# Patient Record
Sex: FEMALE | Employment: FULL TIME | URBAN - METROPOLITAN AREA
[De-identification: names, ages, dates, MRNs, and addresses within clinical notes are randomized per-mention and may not be internally consistent; named-entity substitution may affect disease eponyms.]

---

## 2019-05-27 ENCOUNTER — HOSPITAL ENCOUNTER (EMERGENCY)
Facility: CLINIC | Age: 44
Discharge: HOME | End: 2019-05-27
Attending: NURSE PRACTITIONER | Admitting: NURSE PRACTITIONER
Payer: COMMERCIAL

## 2019-05-27 VITALS
RESPIRATION RATE: 16 BRPM | TEMPERATURE: 98.4 F | OXYGEN SATURATION: 96 % | WEIGHT: 160 LBS | HEART RATE: 90 BPM | DIASTOLIC BLOOD PRESSURE: 74 MMHG | SYSTOLIC BLOOD PRESSURE: 138 MMHG

## 2019-05-27 DIAGNOSIS — M25.50 MULTIPLE JOINT PAIN: ICD-10-CM

## 2019-05-27 LAB
ALBUMIN SERPL-MCNC: 3.7 G/DL (ref 3.4–5)
ALP SERPL-CCNC: 104 U/L (ref 40–150)
ALT SERPL W P-5'-P-CCNC: 21 U/L (ref 0–50)
ANION GAP SERPL CALCULATED.3IONS-SCNC: 5 MMOL/L (ref 3–14)
AST SERPL W P-5'-P-CCNC: 12 U/L (ref 0–45)
BASOPHILS # BLD AUTO: 0 10E9/L (ref 0–0.2)
BASOPHILS NFR BLD AUTO: 0.3 %
BILIRUB SERPL-MCNC: 0.4 MG/DL (ref 0.2–1.3)
BUN SERPL-MCNC: 11 MG/DL (ref 7–30)
CALCIUM SERPL-MCNC: 9.4 MG/DL (ref 8.5–10.1)
CHLORIDE SERPL-SCNC: 104 MMOL/L (ref 94–109)
CO2 SERPL-SCNC: 29 MMOL/L (ref 20–32)
CREAT SERPL-MCNC: 0.67 MG/DL (ref 0.52–1.04)
CRP SERPL-MCNC: 52.7 MG/L (ref 0–8)
DIFFERENTIAL METHOD BLD: ABNORMAL
EOSINOPHIL # BLD AUTO: 0.3 10E9/L (ref 0–0.7)
EOSINOPHIL NFR BLD AUTO: 3.8 %
ERYTHROCYTE [DISTWIDTH] IN BLOOD BY AUTOMATED COUNT: 12.9 % (ref 10–15)
ERYTHROCYTE [SEDIMENTATION RATE] IN BLOOD BY WESTERGREN METHOD: 18 MM/H (ref 0–20)
GFR SERPL CREATININE-BSD FRML MDRD: >90 ML/MIN/{1.73_M2}
GLUCOSE SERPL-MCNC: 103 MG/DL (ref 70–99)
HCT VFR BLD AUTO: 45.9 % (ref 35–47)
HGB BLD-MCNC: 14.8 G/DL (ref 11.7–15.7)
IMM GRANULOCYTES # BLD: 0.1 10E9/L (ref 0–0.4)
IMM GRANULOCYTES NFR BLD: 0.6 %
LYMPHOCYTES # BLD AUTO: 2 10E9/L (ref 0.8–5.3)
LYMPHOCYTES NFR BLD AUTO: 21.5 %
MCH RBC QN AUTO: 27.8 PG (ref 26.5–33)
MCHC RBC AUTO-ENTMCNC: 32.2 G/DL (ref 31.5–36.5)
MCV RBC AUTO: 86 FL (ref 78–100)
MONOCYTES # BLD AUTO: 0.7 10E9/L (ref 0–1.3)
MONOCYTES NFR BLD AUTO: 7.8 %
NEUTROPHILS # BLD AUTO: 6 10E9/L (ref 1.6–8.3)
NEUTROPHILS NFR BLD AUTO: 66 %
NRBC # BLD AUTO: 0 10*3/UL
NRBC BLD AUTO-RTO: 0 /100
PLATELET # BLD AUTO: 417 10E9/L (ref 150–450)
POTASSIUM SERPL-SCNC: 3.6 MMOL/L (ref 3.4–5.3)
PROT SERPL-MCNC: 7.9 G/DL (ref 6.8–8.8)
RBC # BLD AUTO: 5.32 10E12/L (ref 3.8–5.2)
SODIUM SERPL-SCNC: 138 MMOL/L (ref 133–144)
TSH SERPL DL<=0.005 MIU/L-ACNC: 1.76 MU/L (ref 0.4–4)
WBC # BLD AUTO: 9.1 10E9/L (ref 4–11)

## 2019-05-27 PROCEDURE — 86618 LYME DISEASE ANTIBODY: CPT | Performed by: NURSE PRACTITIONER

## 2019-05-27 PROCEDURE — 85652 RBC SED RATE AUTOMATED: CPT | Performed by: NURSE PRACTITIONER

## 2019-05-27 PROCEDURE — 84443 ASSAY THYROID STIM HORMONE: CPT | Performed by: NURSE PRACTITIONER

## 2019-05-27 PROCEDURE — 86140 C-REACTIVE PROTEIN: CPT | Performed by: NURSE PRACTITIONER

## 2019-05-27 PROCEDURE — 99284 EMERGENCY DEPT VISIT MOD MDM: CPT | Mod: Z6 | Performed by: NURSE PRACTITIONER

## 2019-05-27 PROCEDURE — 99283 EMERGENCY DEPT VISIT LOW MDM: CPT | Performed by: NURSE PRACTITIONER

## 2019-05-27 PROCEDURE — 80053 COMPREHEN METABOLIC PANEL: CPT | Performed by: NURSE PRACTITIONER

## 2019-05-27 PROCEDURE — 85025 COMPLETE CBC W/AUTO DIFF WBC: CPT | Performed by: NURSE PRACTITIONER

## 2019-05-27 RX ORDER — IBUPROFEN 200 MG
200 TABLET ORAL EVERY 4 HOURS PRN
COMMUNITY

## 2019-05-27 RX ORDER — PREDNISONE 20 MG/1
TABLET ORAL
Qty: 10 TABLET | Refills: 0 | Status: SHIPPED | OUTPATIENT
Start: 2019-05-27 | End: 2019-05-31

## 2019-05-27 ASSESSMENT — ENCOUNTER SYMPTOMS
WHEEZING: 0
COUGH: 0
CONFUSION: 0
EYE REDNESS: 0
JOINT SWELLING: 1
SHORTNESS OF BREATH: 0
DYSURIA: 0
CONSTIPATION: 0
VOMITING: 0
NAUSEA: 0
HEADACHES: 0
FEVER: 0
CHILLS: 0
MYALGIAS: 1
ARTHRALGIAS: 1
ABDOMINAL PAIN: 0
DIAPHORESIS: 0
DIFFICULTY URINATING: 0
DIARRHEA: 0

## 2019-05-27 NOTE — DISCHARGE INSTRUCTIONS
Start taking the prednisone and take 2 tablets this afternoon and then continue taking 2 tablets once daily every morning for the remaining 4 days.  For pain management stop taking the ibuprofen until you have finished up with the prednisone.  Start taking 2 extra strength Tylenol 4 times daily for pain management.  With the prednisone or any ibuprofen this may increase your risk of gastrointestinal bleeding and stop if you should start to have any blood in the stool.  Follow-up with the rheumatologist of your choice.  I have sent a referral in for rheumatology at [facility] due to lack of availability sooner here.

## 2019-05-27 NOTE — ED NOTES
Pt here with joint pain, started to notice joint pain in her hands about 1 year ago, pain was worse over the winter. Over the past week the pain has gotten much worse, she has pain in every joint and swelling in her hands and feel. Pt was told to see a rheumatologist about 1 week ago but can't get in until the end of June. Has psoriasis on her neck- well controlled.

## 2019-05-27 NOTE — ED PROVIDER NOTES
"  History     Chief Complaint   Patient presents with     Joint Pain     all over body joint pain. Shooting pain with any mvmt or lack of mvmt. Started worse the pst week.      Swelling     all over body swelling, arms and legs.      HPI     Norma Freire is a 44 year old female who who admits to past medical history of scalp psoriasis presenting to the emergency department with generalized joint pains.  Patient states she has had some intermittent pain over the past year and and reports that over the past month it seems to be getting slowly progressively worse.  Patient states over the past week she has been having difficulty with getting out of bed in the morning due to the severe pain and difficulty with her job of using her hands.  Patient describes that the majority of pain is in her left hand and finger joint and in her right wrist area and in the metatarsal phalangeal joints of bilateral feet.  Patient states that the feet feel \"puffy.\"  Patient denies fever, aches, chills, sweats.  Patient denies previous work-up with rheumatology but does admit that when she saw her dermatologist approximately 1 month ago that she consider seeing a rheumatologist.  Patient states that she called 1 rheumatologist and was not able to get in until the end of June and it was only if she had a referral.  Patient states she tried calling the [employer]  [place] or [employer] [facility] and was advised that first available opening was in September.  Patient reports that she has been taking 600 mg of ibuprofen every 4 hours for pain management and reports that this makes the pain slightly bearable but reports still severe aching pain.    Allergies:  No Known Allergies    Problem List:    There are no active problems to display for this patient.       Past Medical History:    No past medical history on file.    Past Surgical History:    No past surgical history on file.    Family History:    No family history on file.    Social " History:  Marital Status:   [2]  Social History     Tobacco Use     Smoking status: Not on file   Substance Use Topics     Alcohol use: Not on file     Drug use: Not on file        Medications:      ibuprofen (ADVIL/MOTRIN) 200 MG tablet   predniSONE (DELTASONE) 20 MG tablet     Review of Systems   Constitutional: Negative for chills, diaphoresis and fever.   HENT: Negative for congestion and ear pain.    Eyes: Negative for redness and visual disturbance.   Respiratory: Negative for cough, shortness of breath and wheezing.    Cardiovascular: Negative for chest pain.   Gastrointestinal: Negative for abdominal pain, constipation, diarrhea, nausea and vomiting.   Genitourinary: Negative for difficulty urinating and dysuria.   Musculoskeletal: Positive for arthralgias (wrists, hands, knees, feet bilaterally), joint swelling (metatarsal phalangeal, left hand, right wrist) and myalgias.   Neurological: Negative for headaches.   Psychiatric/Behavioral: Negative for confusion.   All other systems reviewed and are negative.      Physical Exam   BP: 138/74  Pulse: 90  Temp: 98.4  F (36.9  C)  Resp: 16  Weight: 72.6 kg (160 lb)  SpO2: 96 %      Physical Exam   Constitutional: She is oriented to person, place, and time. She appears well-developed and well-nourished. No distress.   HENT:   Head: Normocephalic and atraumatic.   Right Ear: External ear normal.   Left Ear: External ear normal.   Eyes: Conjunctivae are normal. Right eye exhibits no discharge. Left eye exhibits no discharge. No scleral icterus.   Neck: Normal range of motion. Neck supple.   Cardiovascular: Normal rate, regular rhythm and normal heart sounds. Exam reveals no friction rub.   No murmur heard.  Pulmonary/Chest: Effort normal and breath sounds normal. No stridor. No respiratory distress. She has no wheezes. She has no rales. She exhibits no tenderness.   Musculoskeletal:        Right wrist: She exhibits bony tenderness (at the joint wtihout  erythema). She exhibits no swelling and no effusion.        Left wrist: She exhibits bony tenderness (without swelling, erythema, effusion). She exhibits normal range of motion, no swelling, no effusion, no crepitus and no deformity.        Right knee: Normal. She exhibits normal range of motion, no swelling, no effusion, no ecchymosis, no deformity, no laceration and no erythema. No tenderness (no palpable tenderness today) found.        Left knee: She exhibits normal range of motion, no swelling, no effusion, no ecchymosis, no deformity, no laceration and no erythema. No tenderness (no palpable tenderness today) found.        Right hand: She exhibits bony tenderness (metacarpalphalangeal tenderness without erythema, warmth) and swelling (trace). She exhibits normal capillary refill, no deformity and no laceration. Normal sensation noted. Normal strength noted.        Left hand: She exhibits bony tenderness (metacarpalphalangel joints) and swelling (trace hand and finger swelling without erythema, warmth). She exhibits normal range of motion, normal two-point discrimination, normal capillary refill, no deformity and no laceration.        Right foot: There is tenderness, bony tenderness (along metatarsalphalangeal joints with trace swelling without erythema, warmth, deformity.) and swelling. There is normal capillary refill, no crepitus, no deformity and no laceration.        Left foot: There is bony tenderness (along metatarsalphalangeal joints with trace swelling without erythema, warmth, deformity) and swelling. There is normal range of motion, normal capillary refill, no crepitus, no deformity and no laceration.   Neurological: She is alert and oriented to person, place, and time. Coordination normal.   Skin: Skin is warm and dry. No rash noted. She is not diaphoretic. No erythema. No pallor.   Psychiatric: She has a normal mood and affect.   Nursing note and vitals reviewed.      ED Course         Procedures    Results for orders placed or performed during the hospital encounter of 05/27/19 (from the past 24 hour(s))   TSH with free T4 reflex   Result Value Ref Range    TSH 1.76 0.40 - 4.00 mU/L   Comprehensive metabolic panel   Result Value Ref Range    Sodium 138 133 - 144 mmol/L    Potassium 3.6 3.4 - 5.3 mmol/L    Chloride 104 94 - 109 mmol/L    Carbon Dioxide 29 20 - 32 mmol/L    Anion Gap 5 3 - 14 mmol/L    Glucose 103 (H) 70 - 99 mg/dL    Urea Nitrogen 11 7 - 30 mg/dL    Creatinine 0.67 0.52 - 1.04 mg/dL    GFR Estimate >90 >60 mL/min/[1.73_m2]    GFR Estimate If Black >90 >60 mL/min/[1.73_m2]    Calcium 9.4 8.5 - 10.1 mg/dL    Bilirubin Total 0.4 0.2 - 1.3 mg/dL    Albumin 3.7 3.4 - 5.0 g/dL    Protein Total 7.9 6.8 - 8.8 g/dL    Alkaline Phosphatase 104 40 - 150 U/L    ALT 21 0 - 50 U/L    AST 12 0 - 45 U/L   CBC with platelets differential   Result Value Ref Range    WBC 9.1 4.0 - 11.0 10e9/L    RBC Count 5.32 (H) 3.8 - 5.2 10e12/L    Hemoglobin 14.8 11.7 - 15.7 g/dL    Hematocrit 45.9 35.0 - 47.0 %    MCV 86 78 - 100 fl    MCH 27.8 26.5 - 33.0 pg    MCHC 32.2 31.5 - 36.5 g/dL    RDW 12.9 10.0 - 15.0 %    Platelet Count 417 150 - 450 10e9/L    Diff Method Automated Method     % Neutrophils 66.0 %    % Lymphocytes 21.5 %    % Monocytes 7.8 %    % Eosinophils 3.8 %    % Basophils 0.3 %    % Immature Granulocytes 0.6 %    Nucleated RBCs 0 0 /100    Absolute Neutrophil 6.0 1.6 - 8.3 10e9/L    Absolute Lymphocytes 2.0 0.8 - 5.3 10e9/L    Absolute Monocytes 0.7 0.0 - 1.3 10e9/L    Absolute Eosinophils 0.3 0.0 - 0.7 10e9/L    Absolute Basophils 0.0 0.0 - 0.2 10e9/L    Abs Immature Granulocytes 0.1 0 - 0.4 10e9/L    Absolute Nucleated RBC 0.0    CRP inflammation   Result Value Ref Range    CRP Inflammation 52.7 (H) 0.0 - 8.0 mg/L   Erythrocyte sedimentation rate auto   Result Value Ref Range    Sed Rate 18 0 - 20 mm/h       Medications - No data to display    Assessments & Plan (with Medical Decision  Making)  Norma Freire is a 44 year old female who who admits to past medical history of scalp psoriasis presenting to the emergency department with generalized joint pains with most predominant symptoms and hands and metatarsal phalangeal joints of both feet but reported in bilateral knees and elbows as well.  Patient on exam has no evidence of septic joint as there is no erythema or warmth or profuse swelling.  Sed rate is obtained and is normal CRP is noted to be elevated but likely due to an autoimmune suspected process or an outlying viral illness induced joint pain CBC is unremarkable comprehensive metabolic panel is unremarkable.  TSH completed and is normal and thyroid unlikely to be etiology for joint pain.  Will initiate patient on prednisone 40 mg daily for 5 days and initiate referral to rheumatology.  Advised to not be taking ibuprofen's or NSAIDs while taking the prednisone.  Will take the extra strength Tylenol as needed.  Encourage follow-up as needed.  Patient agreeable to plan of care and discharged in stable condition.     I have reviewed the nursing notes.    I have reviewed the findings, diagnosis, plan and need for follow up with the patient.       Medication List      Started    predniSONE 20 MG tablet  Commonly known as:  DELTASONE  Take two tablets (= 40mg) each day for 5 (five) days            Final diagnoses:   Multiple joint pain       5/27/2019   [facility] EMERGENCY DEPARTMENT     Leeanne Hall, SELENA CNP  05/27/19 0257

## 2019-05-27 NOTE — ED AVS SNAPSHOT
[facility] Emergency Department  [address] [address]  Phone:  [phone]  Fax:  [phone]                                    Norma Freire   MRN: [id number]    Department:  [facility] Emergency Department   Date of Visit:  5/27/2019           After Visit Summary Signature Page    I have received my discharge instructions, and my questions have been answered. I have discussed any challenges I see with this plan with the nurse or doctor.    ..........................................................................................................................................  Patient/Patient Representative Signature      ..........................................................................................................................................  Patient Representative Print Name and Relationship to Patient    ..................................................               ................................................  Date                                   Time    ..........................................................................................................................................  Reviewed by Signature/Title    ...................................................              ..............................................  Date                                               Time          22EPIC Rev 08/18

## 2019-05-28 LAB — B BURGDOR IGG+IGM SER QL: 0.03 (ref 0–0.89)

## 2019-05-28 NOTE — RESULT ENCOUNTER NOTE
Final result for Lyme Disease Agata with reflex to WB Serum is NEGATIVE.    No change in treatment per [place] ED Lab Result protocol.

## 2019-05-31 ENCOUNTER — OFFICE VISIT (OUTPATIENT)
Dept: FAMILY MEDICINE | Facility: CLINIC | Age: 44
End: 2019-05-31
Payer: COMMERCIAL

## 2019-05-31 VITALS
WEIGHT: 164 LBS | TEMPERATURE: 98.7 F | BODY MASS INDEX: 30.18 KG/M2 | HEART RATE: 68 BPM | OXYGEN SATURATION: 97 % | HEIGHT: 62 IN | RESPIRATION RATE: 16 BRPM | DIASTOLIC BLOOD PRESSURE: 80 MMHG | SYSTOLIC BLOOD PRESSURE: 124 MMHG

## 2019-05-31 DIAGNOSIS — M25.50 MULTIPLE JOINT PAIN: Primary | ICD-10-CM

## 2019-05-31 PROCEDURE — 99214 OFFICE O/P EST MOD 30 MIN: CPT | Performed by: NURSE PRACTITIONER

## 2019-05-31 PROCEDURE — 86200 CCP ANTIBODY: CPT | Performed by: NURSE PRACTITIONER

## 2019-05-31 PROCEDURE — 86038 ANTINUCLEAR ANTIBODIES: CPT | Performed by: NURSE PRACTITIONER

## 2019-05-31 PROCEDURE — 36415 COLL VENOUS BLD VENIPUNCTURE: CPT | Performed by: NURSE PRACTITIONER

## 2019-05-31 PROCEDURE — 86431 RHEUMATOID FACTOR QUANT: CPT | Performed by: NURSE PRACTITIONER

## 2019-05-31 PROCEDURE — 86039 ANTINUCLEAR ANTIBODIES (ANA): CPT | Performed by: NURSE PRACTITIONER

## 2019-05-31 RX ORDER — CYCLOBENZAPRINE HCL 10 MG
10 TABLET ORAL
Qty: 30 TABLET | Refills: 1 | Status: SHIPPED | OUTPATIENT
Start: 2019-05-31 | End: 2021-03-31

## 2019-05-31 RX ORDER — PREDNISONE 10 MG/1
TABLET ORAL
Qty: 37 TABLET | Refills: 0 | Status: SHIPPED | OUTPATIENT
Start: 2019-05-31 | End: 2019-07-10

## 2019-05-31 ASSESSMENT — MIFFLIN-ST. JEOR: SCORE: 1357.15

## 2019-05-31 ASSESSMENT — PAIN SCALES - GENERAL: PAINLEVEL: MODERATE PAIN (4)

## 2019-05-31 NOTE — LETTER
[facility]  [address] [address]  Phone: [phone]    June 5, 2019        Norma Freire  [address] [facility] [address] [facility] [address]          Dear Norma,      Your labs for rheumatoid arthritis negative, but the DARRON test is positive, which means that pain in your joints can be related to autoimmune disorder. Recommend follow up with rheumatologist.       Component      Latest Ref Rng & Units 5/31/2019   DARRON interpretation      NEG:Negative Positive (A)   DARRON pattern 1       HOMOGENEOUS   DARRON titer 1       1:640   Cyclic Citrullinated Peptide Antibody, IgG      <7 U/mL 1   Rheumatoid Factor      <20 IU/mL <20         Sincerely,        Debby Jacinto, ANAI  / rebel cma

## 2019-05-31 NOTE — PROGRESS NOTES
"Subjective     Norma Freire is a 44 year old female who presents to clinic today for the following health issues: the patient with history of psoriasis complains of pain and stiffness, sometimes mild edema affecting multiple joints: hands, elbow, shoulders, feet, neck and lower back. She was seen in ED, her labs were normal except for elevated CRP. She started Prednisone 40 mg daily, she states she is feeling slightly better, has appointment with rheumatologist on June 11 th.     HPI   Refill of prednisone - possible rheumatoid arthritis - joint pain - was seen in ER on [facility] Day      Reviewed and updated as needed this visit by Provider         Review of Systems   ROS COMP: Constitutional, HEENT, cardiovascular, pulmonary, gi and gu systems are negative, except as otherwise noted.      Objective    /80 (BP Location: Right arm, Patient Position: Left side, Cuff Size: Adult Regular)   Pulse 68   Temp 98.7  F (37.1  C) (Tympanic)   Resp 16   Ht 1.575 m (5' 2\")   Wt 74.4 kg (164 lb)   LMP 03/31/2019 (Approximate)   SpO2 97%   Breastfeeding? No   BMI 30.00 kg/m    Body mass index is 30 kg/m .  Physical Exam   GENERAL: healthy, alert and no distress  MS: no gross musculoskeletal defects noted, no edema  SKIN: no suspicious lesions or rashes  NEURO: Normal strength and tone, mentation intact and speech normal  PSYCH: mentation appears normal, affect normal/bright    Diagnostic Test Results:  Labs reviewed in Epic  Pending         Assessment & Plan     1. Multiple joint pain    - Anti Nuclear Agata IgG by IFA with Reflex  - Cyclic Citrullinated Peptide Antibody IgG  - Rheumatoid factor  - predniSONE (DELTASONE) 10 MG tablet; 40 mg daily for 3 days, 30 mg daily for 5 days, 20 mg daily for 5 days, follow up with rheumatologist  Dispense: 37 tablet; Refill: 0  - cyclobenzaprine (FLEXERIL) 10 MG tablet; Take 1 tablet (10 mg) by mouth nightly as needed for other (back, neck pain)  Dispense: 30 tablet; " Refill: 1     See Patient Instructions    Return in 11 days (on 6/11/2019), or pain in joints .    SELENA Trejo [facility]

## 2019-05-31 NOTE — PATIENT INSTRUCTIONS
Prednisone taper  -40 mg daily for 3 days, 30 mg daily for 5 days, 20 mg daily for 5 days, follow up with rheumatologist  Try Flexeril 10 mg daily at bedtime as needed for pain  Labs  Follow up with rheumatologist

## 2019-06-03 LAB
ANA PAT SER IF-IMP: ABNORMAL
ANA SER QL IF: POSITIVE
ANA TITR SER IF: ABNORMAL {TITER}
CCP AB SER IA-ACNC: 1 U/ML
RHEUMATOID FACT SER NEPH-ACNC: <20 IU/ML (ref 0–20)

## 2019-07-03 ENCOUNTER — TELEPHONE (OUTPATIENT)
Dept: FAMILY MEDICINE | Facility: CLINIC | Age: 44
End: 2019-07-03

## 2019-07-03 NOTE — LETTER
[facility]  [address]  [facility] [address]  Phone: [phone]      7/3/2019     Norma Freire  [address]  [place] [address]      Dear Norma:    We care about your health and have reviewed your chart in order to best serve your health care needs. Based on this review, it is recommended that you follow up regarding the following health topic(s):      You are due for a PHYSICAL with PAP screening. Please contact the clinic scheduling desk at [phone] to schedule this appointment with the provider of your choice.     If you have any additional questions or concerns, or if you have had testing done elsewhere, please notify your clinic. Thank you for trusting [facility] and we appreciate the opportunity to serve you.  We look forward to supporting your healthcare needs. Take care!      Sincerely,      Debby WALTERS CNP/Chetna Dickens MA

## 2019-07-03 NOTE — TELEPHONE ENCOUNTER
Panel Management Review      Patient has the following on her problem list: There is no problem list on file for this patient.    Composite cancer screening  Chart review shows that this patient is due/due soon for the following   Health Maintenance   Topic Date Due     PREVENTIVE CARE VISIT  1975     PAP  1975     HIV SCREENING  02/12/1992     DTAP/TDAP/TD IMMUNIZATION (1 - Tdap) 02/12/2002     PHQ-2  01/01/2019     INFLUENZA VACCINE (1) 09/01/2019     IPV IMMUNIZATION  Aged Out     MENINGITIS IMMUNIZATION  Aged Out     Summary:    Patient is due/failing the following:   Physical with pap    Action needed:   Patient needs office visit for PE with pap .    Type of outreach:    Sent letter.    Questions for provider review:    None                                                                                                                                    Chetna Dickens MA     Chart routed to none .

## 2019-07-10 ENCOUNTER — OFFICE VISIT (OUTPATIENT)
Dept: FAMILY MEDICINE | Facility: CLINIC | Age: 44
End: 2019-07-10
Payer: COMMERCIAL

## 2019-07-10 VITALS
HEIGHT: 62 IN | DIASTOLIC BLOOD PRESSURE: 78 MMHG | TEMPERATURE: 98 F | HEART RATE: 87 BPM | BODY MASS INDEX: 30.6 KG/M2 | WEIGHT: 166.3 LBS | RESPIRATION RATE: 18 BRPM | SYSTOLIC BLOOD PRESSURE: 124 MMHG | OXYGEN SATURATION: 99 %

## 2019-07-10 DIAGNOSIS — L40.50 PSORIATIC ARTHRITIS (H): Primary | ICD-10-CM

## 2019-07-10 PROCEDURE — 99213 OFFICE O/P EST LOW 20 MIN: CPT | Performed by: NURSE PRACTITIONER

## 2019-07-10 RX ORDER — MELOXICAM 15 MG/1
15 TABLET ORAL
COMMUNITY
Start: 2019-06-11 | End: 2019-07-10

## 2019-07-10 RX ORDER — CELECOXIB 100 MG/1
100 CAPSULE ORAL 2 TIMES DAILY
Qty: 60 CAPSULE | Refills: 2 | Status: SHIPPED | OUTPATIENT
Start: 2019-07-10 | End: 2020-01-28

## 2019-07-10 ASSESSMENT — MIFFLIN-ST. JEOR: SCORE: 1367.58

## 2019-07-10 NOTE — PROGRESS NOTES
"Subjective     Norma Freire is a 44 year old female who presents to clinic today for the following health issues: follow up on psoriatic arthritis, still having pain, did not tolerate Methotrexate, not taking it, would like to see different rheumatologist.       Chief Complaint   Patient presents with     Referral     Would like a referral to see a different rheumatologist, OB doctor recommended DR Jewell      HPI     Reviewed and updated as needed this visit by Provider         Review of Systems   ROS COMP: Constitutional, HEENT, cardiovascular, pulmonary, gi and gu systems are negative, except as otherwise noted.      Objective    /78 (BP Location: Left arm, Patient Position: Sitting, Cuff Size: Adult Regular)   Pulse 87   Temp 98  F (36.7  C) (Tympanic)   Resp 18   Ht 1.575 m (5' 2\")   Wt 75.4 kg (166 lb 4.8 oz)   LMP 07/01/2019   SpO2 99%   BMI 30.42 kg/m    Body mass index is 30.42 kg/m .  Physical Exam   GENERAL: healthy, alert and no distress  MS: no gross musculoskeletal defects noted, no edema  PSYCH: mentation appears normal, affect normal/bright    Diagnostic Test Results:  Labs reviewed in Epic        Assessment & Plan     1. Psoriatic arthritis (H)    - RHEUMATOLOGY REFERRAL  - celecoxib (CELEBREX) 100 MG capsule; Take 1 capsule (100 mg) by mouth 2 times daily  Dispense: 60 capsule; Refill: 2       See Patient Instructions    Return in about 1 month (around 8/10/2019) for rheumatology .    SELENA Trejo [facility]      "

## 2019-12-04 ENCOUNTER — TELEPHONE (OUTPATIENT)
Dept: FAMILY MEDICINE | Facility: CLINIC | Age: 44
End: 2019-12-04

## 2019-12-04 NOTE — LETTER
[facility]  [address]  [facility] [address]  Phone: [phone]      12/4/2019     Norma Freire  [address] [place] [address]      Dear Norma:    Here at [place], we care about your health and have reviewed your chart in order to best serve your health care needs. Based on this review, it is recommended that you complete the following:    You are due for an annual preventative physical with a pap screening. Please contact the clinic scheduling desk at [phone] to schedule this appointment with the provider of your choice . A pap test is used to detect cervical cancer. The test should be taken at least once every three years but women who are at a greater risk for cervical cancer may need to have the test more often. Recommended every three years for women 21 and older.     You are at a greater risk for cervical cancer if:   - You have had a sexually transmitted disease   - You have had more than one sex partner   - You have had an abnormal pap test in the past    If you have any additional questions or concerns, or if you have had testing done elsewhere, please notify your clinic. Thank you for trusting [facility] and we appreciate the opportunity to serve you. We look forward to supporting your healthcare needs soon.    If you have a My-Chart Account, you also can schedule this appointment through there.      Sincerely,        Debby WALTERS CNP/Chetna Dickens MA

## 2019-12-04 NOTE — TELEPHONE ENCOUNTER
Panel Management Review      Patient has the following on her problem list: There is no problem list on file for this patient.        Composite cancer screening  Chart review shows that this patient is due/due soon for the following   Health Maintenance   Topic Date Due     PREVENTIVE CARE VISIT  1977     DTAP/TDAP/TD IMMUNIZATION (1 - Tdap) 02/12/1988     HIV SCREENING  02/12/1992     HPV  02/12/1998     PAP  02/12/2002     INFLUENZA VACCINE (1) 09/01/2019     PHQ-2  Completed     IPV IMMUNIZATION  Aged Out     MENINGITIS IMMUNIZATION  Aged Out     Summary:    Patient is due/failing the following:   PAP and PHYSICAL    Action needed:   Patient needs office visit for physical.    Type of outreach:    Sent letter.    Questions for provider review:    None                                                                                                                                    Chetna Dickens CMA     Chart routed to none .

## 2020-01-23 DIAGNOSIS — L40.50 PSORIATIC ARTHRITIS (H): ICD-10-CM

## 2020-01-23 NOTE — TELEPHONE ENCOUNTER
"Requested Prescriptions   Pending Prescriptions Disp Refills     celecoxib (CELEBREX) 100 MG capsule [Pharmacy Med Name: Celecoxib 100 MG Oral Capsule]  0     Sig: TAKE 1 CAPSULE BY MOUTH TWICE DAILY       NSAID Medications Failed - 1/23/2020  8:08 AM        Failed - Normal CBC on file in past 12 months     Recent Labs   Lab Test 05/27/19  1334   WBC 9.1   RBC 5.32*   HGB 14.8   HCT 45.9                    Passed - Blood pressure under 140/90 in past 12 months     BP Readings from Last 3 Encounters:   07/10/19 124/78   05/31/19 124/80   05/27/19 138/74                 Passed - Normal ALT on file in past 12 months     Recent Labs   Lab Test 05/27/19  1334   ALT 21             Passed - Normal AST on file in past 12 months     Recent Labs   Lab Test 05/27/19  1334   AST 12             Passed - Recent (12 mo) or future (30 days) visit within the authorizing provider's specialty     Patient has had an office visit with the authorizing provider or a provider within the authorizing providers department within the previous 12 mos or has a future within next 30 days. See \"Patient Info\" tab in inbasket, or \"Choose Columns\" in Meds & Orders section of the refill encounter.              Passed - Patient is age 6-64 years        Passed - Medication is active on med list        Passed - No active pregnancy on record        Passed - Normal serum creatinine on file in past 12 months     Recent Labs   Lab Test 05/27/19  1334   CR 0.67             Passed - No positive pregnancy test in past 12 months        Last Written Prescription Date:  7/10/19  Last Fill Quantity: 60,  # refills: 2   Last office visit: 7/10/2019 with prescribing provider:  [place]   Future Office Visit:      "

## 2020-01-27 NOTE — TELEPHONE ENCOUNTER
Routing refill request to provider for review/approval because:  Labs out of range:  RBC    Routing to provider.    Mallory BARTH Rn

## 2020-01-28 RX ORDER — CELECOXIB 100 MG/1
CAPSULE ORAL
Qty: 60 CAPSULE | Refills: 2 | Status: SHIPPED | OUTPATIENT
Start: 2020-01-28 | End: 2020-05-28

## 2020-02-23 ENCOUNTER — HOSPITAL ENCOUNTER (EMERGENCY)
Facility: CLINIC | Age: 45
Discharge: HOME | End: 2020-02-23
Attending: NURSE PRACTITIONER | Admitting: NURSE PRACTITIONER
Payer: COMMERCIAL

## 2020-02-23 VITALS
HEIGHT: 62 IN | SYSTOLIC BLOOD PRESSURE: 129 MMHG | RESPIRATION RATE: 16 BRPM | OXYGEN SATURATION: 99 % | HEART RATE: 63 BPM | BODY MASS INDEX: 30.42 KG/M2 | TEMPERATURE: 97.8 F | DIASTOLIC BLOOD PRESSURE: 87 MMHG

## 2020-02-23 DIAGNOSIS — S61.419A LACERATION OF HAND: ICD-10-CM

## 2020-02-23 PROCEDURE — 12001 RPR S/N/AX/GEN/TRNK 2.5CM/<: CPT | Performed by: NURSE PRACTITIONER

## 2020-02-23 PROCEDURE — 12001 RPR S/N/AX/GEN/TRNK 2.5CM/<: CPT | Mod: Z6 | Performed by: NURSE PRACTITIONER

## 2020-02-23 PROCEDURE — 90471 IMMUNIZATION ADMIN: CPT | Performed by: NURSE PRACTITIONER

## 2020-02-23 PROCEDURE — G0463 HOSPITAL OUTPT CLINIC VISIT: HCPCS | Mod: 25 | Performed by: NURSE PRACTITIONER

## 2020-02-23 PROCEDURE — 90715 TDAP VACCINE 7 YRS/> IM: CPT | Performed by: NURSE PRACTITIONER

## 2020-02-23 PROCEDURE — 25000128 H RX IP 250 OP 636: Performed by: NURSE PRACTITIONER

## 2020-02-23 PROCEDURE — 99213 OFFICE O/P EST LOW 20 MIN: CPT | Mod: 25 | Performed by: NURSE PRACTITIONER

## 2020-02-23 RX ADMIN — CLOSTRIDIUM TETANI TOXOID ANTIGEN (FORMALDEHYDE INACTIVATED), CORYNEBACTERIUM DIPHTHERIAE TOXOID ANTIGEN (FORMALDEHYDE INACTIVATED), BORDETELLA PERTUSSIS TOXOID ANTIGEN (GLUTARALDEHYDE INACTIVATED), BORDETELLA PERTUSSIS FILAMENTOUS HEMAGGLUTININ ANTIGEN (FORMALDEHYDE INACTIVATED), BORDETELLA PERTUSSIS PERTACTIN ANTIGEN, AND BORDETELLA PERTUSSIS FIMBRIAE 2/3 ANTIGEN 0.5 ML: 5; 2; 2.5; 5; 3; 5 INJECTION, SUSPENSION INTRAMUSCULAR at 19:31

## 2020-02-23 NOTE — ED AVS SNAPSHOT
[facility] Emergency Department  [address] [address]  Phone:  [phone]  Fax:  [phone]                                    Norma Freire   MRN: [id number]    Department:  [facility] Emergency Department   Date of Visit:  2/23/2020           After Visit Summary Signature Page    I have received my discharge instructions, and my questions have been answered. I have discussed any challenges I see with this plan with the nurse or doctor.    ..........................................................................................................................................  Patient/Patient Representative Signature      ..........................................................................................................................................  Patient Representative Print Name and Relationship to Patient    ..................................................               ................................................  Date                                   Time    ..........................................................................................................................................  Reviewed by Signature/Title    ...................................................              ..............................................  Date                                               Time          22EPIC Rev 08/18

## 2020-02-23 NOTE — ED TRIAGE NOTES
Dog bite to left hand by her own dog. States the dog had puppy shots but doesn't really know if totally UTD

## 2020-02-24 NOTE — DISCHARGE INSTRUCTIONS
Augmentin 875 mg twice a day for 5 days.  Change dressing twice a day.  Okay to shower.  Return for any signs of infection as discussed such as increased redness, swelling, or pain.  Sutures out in 10 days.  Tetanus updated today.

## 2020-02-24 NOTE — ED NOTES
Pt presents with puncture wound to left hand from dog bite/ Pt reports no blood thinners and believes the dogs are up to date on vaccines. Ac LPN

## 2020-02-24 NOTE — ED PROVIDER NOTES
"  History     Chief Complaint   Patient presents with     Dog Bite     HPI  Norma Freire is a 45 year old female who presents to urgent care for evaluation of dog bite to the top of her left hand.  This occurred just prior to arrival from her own dog (\"puppy\").  Dog is current on vaccinations.  Patient is not up-to-date on her tetanus.    Allergies:  No Known Allergies    Problem List:    There are no active problems to display for this patient.       Past Medical History:    No past medical history on file.    Past Surgical History:    No past surgical history on file.    Family History:    No family history on file.    Social History:  Marital Status:   [2]  Social History     Tobacco Use     Smoking status: Never Smoker     Smokeless tobacco: Never Used   Substance Use Topics     Alcohol use: Yes     Comment: occasionally     Drug use: Never        Medications:    amoxicillin-clavulanate (AUGMENTIN) 875-125 MG tablet  Acetaminophen (TYLENOL ARTHRITIS PAIN PO)  celecoxib (CELEBREX) 100 MG capsule  Cholecalciferol (VITAMIN D PO)  cyclobenzaprine (FLEXERIL) 10 MG tablet  Glucosamine HCl (GLUCOSAMINE PO)  ibuprofen (ADVIL/MOTRIN) 200 MG tablet  UNABLE TO FIND  UNABLE TO FIND  UNABLE TO FIND          Review of Systems  Neuro: no numbness in the left hand.    Physical Exam   BP: 129/87  Pulse: 63  Temp: 97.8  F (36.6  C)  Resp: 16  Height: 157.5 cm (5' 2\")  SpO2: 99 %      Physical Exam  Appearance: Alert, oriented, no acute distress.  Left hand: Normal strength.  Normal sensation.  Laceration 2 cm noted to the dorsal aspect.  Description: clean wound edges, no foreign bodies. Neurovascular and tendon structures are intact.  Bleeding is controlled      ED Course        Procedures             [facility] Procedure Note        Laceration Repair:    Performed by: SELENA Ghotra CNP  Authorized by: SELENA Ghotra CNP  Consent given by: Patient who states understanding of the " procedure being performed after discussing the risks, benefits and alternatives.    Preparation: Patient was prepped and draped in usual sterile fashion.  Irrigation solution: saline    Body area:left dorsal hand  Laceration length: 2cm  Contamination: The wound is not contaminated.  Foreign bodies:none  Tendon involvement: none  Anesthesia: Local  Local anesthetic: Lidocaine     1%  Anesthetic total: 4ml    Debridement: none  Skin closure: Closed with 2 sutures x 4.0 Ethilon, LOOSELY CLOSED  Technique: interrupted  Approximation: close  Approximation difficulty: simple    Patient tolerance: Patient tolerated the procedure well with no immediate complications.        No results found for this or any previous visit (from the past 24 hour(s)).    Medications   Tdap (tetanus-diphtheria-acell pertussis) (ADACEL) injection 0.5 mL (0.5 mLs Intramuscular Given 2/23/20 1931)       Assessments & Plan (with Medical Decision Making)   Laceration repaired as noted above and was loosely closed due to it being an animal bite and she was provided prophylactic antibiotics.  Instructed to have sutures on 10 days.  Worrisome reasons to recheck discussed.     Plan:    Augmentin 875 mg twice a day for 5 days.  Change dressing twice a day.  Okay to shower.  Return for any signs of infection as discussed such as increased redness, swelling, or pain.  Sutures out in 10 days.  Tetanus updated today.    I have reviewed the nursing notes.    I have reviewed the findings, diagnosis, plan and need for follow up with the patient.      Discharge Medication List as of 2/23/2020  7:30 PM      START taking these medications    Details   amoxicillin-clavulanate (AUGMENTIN) 875-125 MG tablet Take 1 tablet by mouth 2 times daily for 5 days, Disp-10 tablet, R-0, E-Prescribe             Final diagnoses:   Laceration of hand       2/23/2020   [facility] EMERGENCY DEPARTMENT     Brii Charles APRN CNP  02/23/20 [id number]

## 2020-05-19 ENCOUNTER — VIRTUAL VISIT (OUTPATIENT)
Dept: FAMILY MEDICINE | Facility: OTHER | Age: 45
End: 2020-05-19

## 2020-05-19 NOTE — PROGRESS NOTES
"Date: 2020 08:41:43  Clinician: Rob Nye  Clinician NPI: [id number]  Patient: Norma Freire  Patient : 1975  Patient Address: [address] [place] [address], [place] [address]  Patient Phone: [phone]  Visit Protocol: URI  Patient Summary:  Norma is a 45 year old ( : 1975 ) female who initiated a Visit for COVID-19 (Coronavirus) evaluation and screening. When asked the question \"Please sign me up to receive news, health information and promotions from [employer].\", Norma responded \"No\".    Norma states her symptoms started 1-2 days ago.   Her symptoms consist of a sore throat, nasal congestion, a headache, and malaise. She is experiencing mild difficulty breathing with activities but can speak normally in full sentences. Norma also feels feverish but was unable to measure her temperature.   Symptom details     Nasal secretions: The color of her mucus is clear.    Sore throat: Norma reports having mild throat pain (1-3 on a 10 point pain scale), does not have exudate on her tonsils, and can swallow liquids. She is not sure if the lymph nodes in her neck are enlarged. A rash has not appeared on the skin since the sore throat started.     Headache: She states the headache is moderate (4-6 on a 10 point pain scale).      Norma denies having nausea, teeth pain, ageusia, diarrhea, facial pain or pressure, chills, wheezing, cough, vomiting, rhinitis, ear pain, myalgias, and anosmia. She also denies having recent facial or sinus surgery in the past 60 days, taking antibiotic medication for the symptoms, and having a sinus infection within the past year.   Precipitating events  Within the past week, Norma has not been exposed to someone with strep throat.   Pertinent COVID-19 (Coronavirus) information  In the past 14 days, Norma has not worked in a congregate living setting.   She does not work or volunteer as healthcare worker or a  and does not work or volunteer in a " healthcare facility.   Norma also has not lived in a congregate living setting in the past 14 days. She does not live with a healthcare worker.   Norma has not had a close contact with a laboratory-confirmed COVID-19 patient within 14 days of symptom onset.   Pertinent medical history  Norma does not get yeast infections when she takes antibiotics.   Norma does not need a return to work/school note.   Weight: 180 lbs   Norma does not smoke or use smokeless tobacco.   She denies pregnancy and denies breastfeeding. She has menstruated in the past month.   Additional information as reported by the patient (free text): Pressure in my lung area is making it painful to breathe at all times and kept me up through the night.  A sore throat started at the same time as the breathing issues as well as a head ache that is making it extremely hard to focus.   Weight: 180 lbs    MEDICATIONS: methotrexate oral, ALLERGIES: NKDA  Clinician Response:  Dear Norma   Norma,  Most of the time, these pains are coming from your chest wall, but I think it is best that you be seen today. No better way to be sure.&nbsp;  Please call [facility] this morning  This is the scheduling number and the staff will schedule an appointment for you in  today.                Diagnosis: Cough  Diagnosis ICD: R05

## 2020-05-27 DIAGNOSIS — L40.50 PSORIATIC ARTHRITIS (H): ICD-10-CM

## 2020-05-27 RX ORDER — CELECOXIB 100 MG/1
CAPSULE ORAL
Qty: 60 CAPSULE | Refills: 0 | Status: CANCELLED | OUTPATIENT
Start: 2020-05-27

## 2020-05-27 NOTE — LETTER
May 28, 2020      Norma Freire  [address]  [facility] [address]        Dear Norma,     We received a refill request for your celecoxib medication.  This medication has been refilled for 30 days as you are due for an office visit for further refills.  Please call [phone] to schedule an appointment.        Sincerely,        SELENA Trejo CNP

## 2020-05-28 RX ORDER — CELECOXIB 100 MG/1
CAPSULE ORAL
Qty: 60 CAPSULE | Refills: 0 | Status: SHIPPED | OUTPATIENT
Start: 2020-05-28 | End: 2021-03-31

## 2020-05-28 NOTE — TELEPHONE ENCOUNTER
"Routing refill request to provider for review/approval because:  Labs not current:  ALT, AST, CBC          Requested Prescriptions   Pending Prescriptions Disp Refills     celecoxib (CELEBREX) 100 MG capsule [Pharmacy Med Name: Celecoxib 100 MG Oral Capsule] 60 capsule 0     Sig: Take 1 capsule by mouth twice daily       NSAID Medications Failed - 5/27/2020 12:16 PM        Failed - Normal ALT on file in past 12 months     Recent Labs   Lab Test 05/27/19  1334   ALT 21             Failed - Normal AST on file in past 12 months     Recent Labs   Lab Test 05/27/19  1334   AST 12             Failed - Normal CBC on file in past 12 months     Recent Labs   Lab Test 05/27/19  1334   WBC 9.1   RBC 5.32*   HGB 14.8   HCT 45.9                    Failed - Normal serum creatinine on file in past 12 months     Recent Labs   Lab Test 05/27/19  1334   CR 0.67       Ok to refill medication if creatinine is low          Passed - Blood pressure under 140/90 in past 12 months     BP Readings from Last 3 Encounters:   02/23/20 129/87   07/10/19 124/78   05/31/19 124/80                 Passed - Recent (12 mo) or future (30 days) visit within the authorizing provider's specialty     Patient has had an office visit with the authorizing provider or a provider within the authorizing providers department within the previous 12 mos or has a future within next 30 days. See \"Patient Info\" tab in inbasket, or \"Choose Columns\" in Meds & Orders section of the refill encounter.              Passed - Patient is age 6-64 years        Passed - Medication is active on med list        Passed - No active pregnancy on record        Passed - No positive pregnancy test in past 12 months                   "

## 2021-03-16 ENCOUNTER — MYC MEDICAL ADVICE (OUTPATIENT)
Dept: FAMILY MEDICINE | Facility: CLINIC | Age: 46
End: 2021-03-16

## 2021-03-16 DIAGNOSIS — L40.50 PSORIATIC ARTHRITIS (H): Primary | ICD-10-CM

## 2021-03-16 NOTE — TELEPHONE ENCOUNTER
Left message on answering machine for patient to call back. It looks like she just needs to set up lab and appt.  Licha Chong RN

## 2021-03-18 ENCOUNTER — TRANSFERRED RECORDS (OUTPATIENT)
Dept: HEALTH INFORMATION MANAGEMENT | Facility: CLINIC | Age: 46
End: 2021-03-18

## 2021-03-21 ENCOUNTER — HEALTH MAINTENANCE LETTER (OUTPATIENT)
Age: 46
End: 2021-03-21

## 2021-03-31 ENCOUNTER — OFFICE VISIT (OUTPATIENT)
Dept: FAMILY MEDICINE | Facility: CLINIC | Age: 46
End: 2021-03-31
Payer: COMMERCIAL

## 2021-03-31 VITALS
TEMPERATURE: 97.5 F | OXYGEN SATURATION: 100 % | RESPIRATION RATE: 16 BRPM | HEART RATE: 57 BPM | SYSTOLIC BLOOD PRESSURE: 128 MMHG | DIASTOLIC BLOOD PRESSURE: 90 MMHG | WEIGHT: 177.4 LBS | HEIGHT: 62 IN | BODY MASS INDEX: 32.65 KG/M2

## 2021-03-31 DIAGNOSIS — L40.50 PSORIATIC ARTHRITIS (H): Primary | ICD-10-CM

## 2021-03-31 LAB
ALBUMIN SERPL-MCNC: 4.2 G/DL (ref 3.4–5)
ALP SERPL-CCNC: 89 U/L (ref 40–150)
ALT SERPL W P-5'-P-CCNC: 40 U/L (ref 0–50)
ANION GAP SERPL CALCULATED.3IONS-SCNC: 7 MMOL/L (ref 3–14)
AST SERPL W P-5'-P-CCNC: 19 U/L (ref 0–45)
BASOPHILS # BLD AUTO: 0 10E9/L (ref 0–0.2)
BASOPHILS NFR BLD AUTO: 0.4 %
BILIRUB SERPL-MCNC: 0.2 MG/DL (ref 0.2–1.3)
BUN SERPL-MCNC: 11 MG/DL (ref 7–30)
CALCIUM SERPL-MCNC: 9.2 MG/DL (ref 8.5–10.1)
CHLORIDE SERPL-SCNC: 105 MMOL/L (ref 94–109)
CO2 SERPL-SCNC: 27 MMOL/L (ref 20–32)
CREAT SERPL-MCNC: 0.67 MG/DL (ref 0.52–1.04)
DIFFERENTIAL METHOD BLD: NORMAL
EOSINOPHIL # BLD AUTO: 0.3 10E9/L (ref 0–0.7)
EOSINOPHIL NFR BLD AUTO: 3.6 %
ERYTHROCYTE [DISTWIDTH] IN BLOOD BY AUTOMATED COUNT: 13.1 % (ref 10–15)
FOLATE SERPL-MCNC: 22.7 NG/ML
GFR SERPL CREATININE-BSD FRML MDRD: >90 ML/MIN/{1.73_M2}
GLUCOSE SERPL-MCNC: 81 MG/DL (ref 70–99)
HCT VFR BLD AUTO: 42.5 % (ref 35–47)
HGB BLD-MCNC: 13.8 G/DL (ref 11.7–15.7)
LYMPHOCYTES # BLD AUTO: 2.5 10E9/L (ref 0.8–5.3)
LYMPHOCYTES NFR BLD AUTO: 31.9 %
MCH RBC QN AUTO: 30.1 PG (ref 26.5–33)
MCHC RBC AUTO-ENTMCNC: 32.5 G/DL (ref 31.5–36.5)
MCV RBC AUTO: 93 FL (ref 78–100)
MONOCYTES # BLD AUTO: 0.7 10E9/L (ref 0–1.3)
MONOCYTES NFR BLD AUTO: 9 %
NEUTROPHILS # BLD AUTO: 4.4 10E9/L (ref 1.6–8.3)
NEUTROPHILS NFR BLD AUTO: 55.1 %
PLATELET # BLD AUTO: 317 10E9/L (ref 150–450)
POTASSIUM SERPL-SCNC: 4.1 MMOL/L (ref 3.4–5.3)
PROT SERPL-MCNC: 8.2 G/DL (ref 6.8–8.8)
RBC # BLD AUTO: 4.58 10E12/L (ref 3.8–5.2)
SODIUM SERPL-SCNC: 139 MMOL/L (ref 133–144)
WBC # BLD AUTO: 8 10E9/L (ref 4–11)

## 2021-03-31 PROCEDURE — 80053 COMPREHEN METABOLIC PANEL: CPT | Performed by: NURSE PRACTITIONER

## 2021-03-31 PROCEDURE — 82746 ASSAY OF FOLIC ACID SERUM: CPT | Performed by: NURSE PRACTITIONER

## 2021-03-31 PROCEDURE — 36415 COLL VENOUS BLD VENIPUNCTURE: CPT | Performed by: NURSE PRACTITIONER

## 2021-03-31 PROCEDURE — 99213 OFFICE O/P EST LOW 20 MIN: CPT | Performed by: NURSE PRACTITIONER

## 2021-03-31 PROCEDURE — 85025 COMPLETE CBC W/AUTO DIFF WBC: CPT | Performed by: NURSE PRACTITIONER

## 2021-03-31 RX ORDER — FOLIC ACID 1 MG/1
1 TABLET ORAL DAILY
Qty: 30 TABLET | Refills: 5 | Status: SHIPPED | OUTPATIENT
Start: 2021-03-31 | End: 2022-04-19

## 2021-03-31 RX ORDER — METHOTREXATE 2.5 MG/1
TABLET ORAL
COMMUNITY
Start: 2021-03-26 | End: 2021-03-31

## 2021-03-31 RX ORDER — FOLIC ACID 1 MG/1
TABLET ORAL
COMMUNITY
Start: 2021-02-07 | End: 2021-03-31

## 2021-03-31 RX ORDER — METHOTREXATE 2.5 MG/1
TABLET ORAL
Qty: 32 TABLET | Refills: 5 | Status: SHIPPED | OUTPATIENT
Start: 2021-03-31 | End: 2022-01-18

## 2021-03-31 ASSESSMENT — MIFFLIN-ST. JEOR: SCORE: 1403.96

## 2021-03-31 NOTE — PROGRESS NOTES
"    Assessment & Plan     Psoriatic arthritis (H)  -under control, no side effects from Methotrexate, patient in the process of changing her health care insurance and can not get it with her rheumatologist   - methotrexate sodium 2.5 MG TABS; TAKE 8 TABLETS BY MOUTH ONCE A WEEK  - folic acid (FOLVITE) 1 MG tablet; Take 1 tablet (1 mg) by mouth daily  - Folate  - Comprehensive metabolic panel FUTURE anytime  - CBC with platelets and differential  -follow up in 3 months         See Patient Instructions    Return in about 3 months (around 6/30/2021) for Routine Visit.    SELENA Trejo CNP  [facility] is a 46 year old who presents for the following health issues: med refills, also have bruise on her left lower chest area that she would like to take a look    Chief Complaint   Patient presents with     Edema     Refill Request     Folic acid, methotrexate      Musculoskeletal Problem       Review of Systems   Constitutional, HEENT, cardiovascular, pulmonary, gi and gu systems are negative, except as otherwise noted.      Objective    BP (!) 128/90 (BP Location: Right arm, Patient Position: Sitting, Cuff Size: Adult Large)   Pulse 57   Temp 97.5  F (36.4  C) (Tympanic)   Resp 16   Ht 1.568 m (5' 1.75\")   Wt 80.5 kg (177 lb 6.4 oz)   SpO2 100%   BMI 32.71 kg/m    Body mass index is 32.71 kg/m .  Physical Exam   GENERAL: healthy, alert and no distress  CV: regular rate and rhythm, normal S1 S2, no S3 or S4, no murmur, click or rub, no peripheral edema and peripheral pulses strong  ABDOMEN: soft, nontender, no hepatosplenomegaly, no masses and bowel sounds normal  MS: no gross musculoskeletal defects noted, no edema  SKIN: small bruise, non-tender left side chest wall area  NEURO: Normal strength and tone, mentation intact and speech normal  PSYCH: mentation appears normal, affect normal/bright    Results for orders placed or performed in visit on 03/31/21 (from " the past 24 hour(s))   Folate   Result Value Ref Range    Folate 22.7 >5.4 ng/mL   **Comprehensive metabolic panel FUTURE anytime   Result Value Ref Range    Sodium 139 133 - 144 mmol/L    Potassium 4.1 3.4 - 5.3 mmol/L    Chloride 105 94 - 109 mmol/L    Carbon Dioxide 27 20 - 32 mmol/L    Anion Gap 7 3 - 14 mmol/L    Glucose 81 70 - 99 mg/dL    Urea Nitrogen 11 7 - 30 mg/dL    Creatinine 0.67 0.52 - 1.04 mg/dL    GFR Estimate >90 >60 mL/min/[1.73_m2]    GFR Estimate If Black >90 >60 mL/min/[1.73_m2]    Calcium 9.2 8.5 - 10.1 mg/dL    Bilirubin Total 0.2 0.2 - 1.3 mg/dL    Albumin 4.2 3.4 - 5.0 g/dL    Protein Total 8.2 6.8 - 8.8 g/dL    Alkaline Phosphatase 89 40 - 150 U/L    ALT 40 0 - 50 U/L    AST 19 0 - 45 U/L   CBC with platelets and differential   Result Value Ref Range    WBC 8.0 4.0 - 11.0 10e9/L    RBC Count 4.58 3.8 - 5.2 10e12/L    Hemoglobin 13.8 11.7 - 15.7 g/dL    Hematocrit 42.5 35.0 - 47.0 %    MCV 93 78 - 100 fl    MCH 30.1 26.5 - 33.0 pg    MCHC 32.5 31.5 - 36.5 g/dL    RDW 13.1 10.0 - 15.0 %    Platelet Count 317 150 - 450 10e9/L    % Neutrophils 55.1 %    % Lymphocytes 31.9 %    % Monocytes 9.0 %    % Eosinophils 3.6 %    % Basophils 0.4 %    Absolute Neutrophil 4.4 1.6 - 8.3 10e9/L    Absolute Lymphocytes 2.5 0.8 - 5.3 10e9/L    Absolute Monocytes 0.7 0.0 - 1.3 10e9/L    Absolute Eosinophils 0.3 0.0 - 0.7 10e9/L    Absolute Basophils 0.0 0.0 - 0.2 10e9/L    Diff Method Automated Method

## 2021-04-01 PROBLEM — L40.50 PSORIATIC ARTHRITIS (H): Status: ACTIVE | Noted: 2021-04-01

## 2021-06-29 ENCOUNTER — MYC MEDICAL ADVICE (OUTPATIENT)
Dept: FAMILY MEDICINE | Facility: CLINIC | Age: 46
End: 2021-06-29

## 2021-06-30 ENCOUNTER — OFFICE VISIT (OUTPATIENT)
Dept: FAMILY MEDICINE | Facility: CLINIC | Age: 46
End: 2021-06-30
Payer: COMMERCIAL

## 2021-06-30 VITALS
OXYGEN SATURATION: 99 % | SYSTOLIC BLOOD PRESSURE: 112 MMHG | DIASTOLIC BLOOD PRESSURE: 70 MMHG | HEART RATE: 68 BPM | WEIGHT: 180.9 LBS | HEIGHT: 62 IN | RESPIRATION RATE: 16 BRPM | TEMPERATURE: 97.4 F | BODY MASS INDEX: 33.29 KG/M2

## 2021-06-30 DIAGNOSIS — L40.50 PSORIATIC ARTHRITIS (H): Primary | ICD-10-CM

## 2021-06-30 LAB
ALBUMIN SERPL-MCNC: 3.9 G/DL (ref 3.4–5)
ALP SERPL-CCNC: 94 U/L (ref 40–150)
ALT SERPL W P-5'-P-CCNC: 20 U/L (ref 0–50)
ANION GAP SERPL CALCULATED.3IONS-SCNC: 6 MMOL/L (ref 3–14)
AST SERPL W P-5'-P-CCNC: 12 U/L (ref 0–45)
BASOPHILS # BLD AUTO: 0 10E9/L (ref 0–0.2)
BASOPHILS NFR BLD AUTO: 0.4 %
BILIRUB SERPL-MCNC: 0.2 MG/DL (ref 0.2–1.3)
BUN SERPL-MCNC: 11 MG/DL (ref 7–30)
CALCIUM SERPL-MCNC: 8.6 MG/DL (ref 8.5–10.1)
CHLORIDE SERPL-SCNC: 105 MMOL/L (ref 94–109)
CO2 SERPL-SCNC: 26 MMOL/L (ref 20–32)
CREAT SERPL-MCNC: 0.82 MG/DL (ref 0.52–1.04)
DIFFERENTIAL METHOD BLD: NORMAL
EOSINOPHIL # BLD AUTO: 0.3 10E9/L (ref 0–0.7)
EOSINOPHIL NFR BLD AUTO: 3.5 %
ERYTHROCYTE [DISTWIDTH] IN BLOOD BY AUTOMATED COUNT: 12.9 % (ref 10–15)
GFR SERPL CREATININE-BSD FRML MDRD: 87 ML/MIN/{1.73_M2}
GLUCOSE SERPL-MCNC: 88 MG/DL (ref 70–99)
HCT VFR BLD AUTO: 39.9 % (ref 35–47)
HGB BLD-MCNC: 13.1 G/DL (ref 11.7–15.7)
LYMPHOCYTES # BLD AUTO: 2.2 10E9/L (ref 0.8–5.3)
LYMPHOCYTES NFR BLD AUTO: 26 %
MCH RBC QN AUTO: 30.7 PG (ref 26.5–33)
MCHC RBC AUTO-ENTMCNC: 32.8 G/DL (ref 31.5–36.5)
MCV RBC AUTO: 93 FL (ref 78–100)
MONOCYTES # BLD AUTO: 0.7 10E9/L (ref 0–1.3)
MONOCYTES NFR BLD AUTO: 8.5 %
NEUTROPHILS # BLD AUTO: 5.2 10E9/L (ref 1.6–8.3)
NEUTROPHILS NFR BLD AUTO: 61.6 %
PLATELET # BLD AUTO: 327 10E9/L (ref 150–450)
POTASSIUM SERPL-SCNC: 4.1 MMOL/L (ref 3.4–5.3)
PROT SERPL-MCNC: 7.6 G/DL (ref 6.8–8.8)
RBC # BLD AUTO: 4.27 10E12/L (ref 3.8–5.2)
SODIUM SERPL-SCNC: 137 MMOL/L (ref 133–144)
WBC # BLD AUTO: 8.5 10E9/L (ref 4–11)

## 2021-06-30 PROCEDURE — 80053 COMPREHEN METABOLIC PANEL: CPT | Performed by: NURSE PRACTITIONER

## 2021-06-30 PROCEDURE — 85025 COMPLETE CBC W/AUTO DIFF WBC: CPT | Performed by: NURSE PRACTITIONER

## 2021-06-30 PROCEDURE — 36415 COLL VENOUS BLD VENIPUNCTURE: CPT | Performed by: NURSE PRACTITIONER

## 2021-06-30 PROCEDURE — 99213 OFFICE O/P EST LOW 20 MIN: CPT | Performed by: NURSE PRACTITIONER

## 2021-06-30 ASSESSMENT — MIFFLIN-ST. JEOR: SCORE: 1419.84

## 2021-06-30 NOTE — PROGRESS NOTES
"    Assessment & Plan     Psoriatic arthritis (H)  -doing well, no side effects from Methotrexate, labs pending, if normal recommend to repeat labs again in 6 months     - CBC with platelets and differential  - Comprehensive metabolic panel (BMP + Alb, Alk Phos, ALT, AST, Total. Bili, TP)  - Comprehensive metabolic panel FUTURE anytime; Future  - CBC with platelets and differential; Future        See Patient Instructions    Return in about 6 months (around 12/30/2021) for Lab Work.    SELENA Trejo [facility]    Stevie RANGEL is a 46 year old who presents for the following health issues follow up     Chief Complaint   Patient presents with     Blood Draw     She is here to get labs done for methotrexate          HPI         Review of Systems   Constitutional, HEENT, cardiovascular, pulmonary, gi and gu systems are negative, except as otherwise noted.      Objective    /70 (BP Location: Right arm, Patient Position: Sitting, Cuff Size: Adult Large)   Pulse 68   Temp 97.4  F (36.3  C) (Tympanic)   Resp 16   Ht 1.568 m (5' 1.75\")   Wt 82.1 kg (180 lb 14.4 oz)   SpO2 99%   BMI 33.36 kg/m    Body mass index is 33.36 kg/m .  Physical Exam   GENERAL: healthy, alert and no distress  MS: no gross musculoskeletal defects noted, no edema  SKIN: no suspicious lesions or rashes  NEURO: Normal strength and tone, mentation intact and speech normal  PSYCH: mentation appears normal, affect normal/bright                "

## 2021-09-05 ENCOUNTER — HEALTH MAINTENANCE LETTER (OUTPATIENT)
Age: 46
End: 2021-09-05

## 2021-11-13 ENCOUNTER — E-VISIT (OUTPATIENT)
Dept: URGENT CARE | Facility: CLINIC | Age: 46
End: 2021-11-13
Payer: COMMERCIAL

## 2021-11-13 DIAGNOSIS — L40.50 PSORIATIC ARTHRITIS (H): ICD-10-CM

## 2021-11-13 DIAGNOSIS — U07.1 INFECTION DUE TO 2019 NOVEL CORONAVIRUS: Primary | ICD-10-CM

## 2021-11-13 PROCEDURE — 99421 OL DIG E/M SVC 5-10 MIN: CPT | Performed by: PHYSICIAN ASSISTANT

## 2021-11-13 RX ORDER — FLUTICASONE PROPIONATE 50 MCG
1 SPRAY, SUSPENSION (ML) NASAL DAILY
Qty: 16 G | Refills: 0 | Status: SHIPPED | OUTPATIENT
Start: 2021-11-13 | End: 2022-04-21

## 2021-11-13 NOTE — PATIENT INSTRUCTIONS
Dear Norma Freire,    As far as I know access to Monoclonal antibodies is through the [place] department of [facility]. I will place an order for the medication but I am not sure that it will go through/be usable and would need to be done at an infusion center.    Below is information about the [facility] platform:  COVID-19 [facility] Monoclonal Antibody Program Information  Monoclonal antibody infusion via the [place] [employer] Platform (MNRAP) system      [email or web address] - Providers, patients or someone helping may complete this screening tool. [facility] offers this lottery process to refer patients for COVID-19 monoclonal antibody therapeutic REGEN-COV (casirivimab and imdevimab) including post-exposure prophylaxis. Referrals are provisional, and final clinical judgment remains with the infusion site to determine eligibility and scheduling.     I am not permitted to prescribe HCQ or Ivermectin. There is a home study involving ivermectin with the [facility] you could look into called Covid Out(google search covid out and it will take you to the page) otherwise [email or web address] has some resources about treatment protocols ways to get medication prescribed.       Sign up for [employer].   We know it's scary to hear that you might have COVID-19. We want to track your symptoms to make sure you're okay over the next 2 weeks. Please look for an email from [employer]--this is a free, online program that we'll use to keep in touch. To sign up, follow the link in the email you will receive. Learn more at [email or web address]    How can I take care of myself?    Get lots of rest. Drink extra fluids (unless a doctor has told you not to)    Take Tylenol (acetaminophen) or ibuprofen for fever or pain. If you have liver or kidney problems, ask your family doctor if it's okay to take Tylenol o ibuprofen    If you have other health problems (like cancer, heart failure, an organ transplant or  severe kidney disease): Call your specialty clinic if you don't feel better in the next 2 days.    Know when to call 911. Emergency warning signs include:  o Trouble breathing or shortness of breath  o Pain or pressure in the chest that doesn't go away  o Feeling confused like you haven't felt before, or not being able to wake up  o Bluish-colored lips or face    Where can I get more information?   [employer] [place] - About COVID-19:   [email or web address]    CDC - What to Do If You're Sick:   www.cdc.gov/coronavirus/2019-ncov/about/steps-when-sick.html

## 2022-01-16 DIAGNOSIS — L40.50 PSORIATIC ARTHRITIS (H): ICD-10-CM

## 2022-01-17 NOTE — TELEPHONE ENCOUNTER
Routing refill request to provider for review/approval because:  Drug not on the [facility] refill protocol     Pending Prescriptions:                       Disp   Refills    methotrexate sodium 2.5 MG TABS [Pharmacy *32 tab*0        Sig: TAKE 8 TABLETS BY MOUTH ONCE A WEEK    Isis Darnell RN on 1/17/2022 at 3:41 PM

## 2022-01-18 RX ORDER — METHOTREXATE 2.5 MG/1
TABLET ORAL
Qty: 32 TABLET | Refills: 0 | Status: SHIPPED | OUTPATIENT
Start: 2022-01-18 | End: 2022-02-24

## 2022-02-23 DIAGNOSIS — L40.50 PSORIATIC ARTHRITIS (H): ICD-10-CM

## 2022-02-24 RX ORDER — METHOTREXATE 2.5 MG/1
TABLET ORAL
Qty: 32 TABLET | Refills: 0 | Status: SHIPPED | OUTPATIENT
Start: 2022-02-24 | End: 2022-04-06

## 2022-02-24 NOTE — TELEPHONE ENCOUNTER
Routing refill request to provider for review/approval because:  Drug not on the [facility] refill protocol     Pending Prescriptions:                       Disp   Refills    methotrexate sodium 2.5 MG TABS [Pharmacy *32 tab*0        Sig: TAKE 8 TABLETS BY MOUTH ONCE A WEEK    Isis Darnell RN on 2/24/2022 at 11:15 AM

## 2022-04-04 DIAGNOSIS — L40.50 PSORIATIC ARTHRITIS (H): ICD-10-CM

## 2022-04-06 RX ORDER — METHOTREXATE 2.5 MG/1
TABLET ORAL
Qty: 8 TABLET | Refills: 0 | Status: SHIPPED | OUTPATIENT
Start: 2022-04-06 | End: 2022-04-21

## 2022-04-06 NOTE — TELEPHONE ENCOUNTER
Routing refill request to provider for review/approval because:  Drug not on the [facility] refill protocol     Pending Prescriptions:                       Disp   Refills    methotrexate sodium 2.5 MG TABS [Pharmacy *32 tab*0        Sig: TAKE 8 TABLETS BY MOUTH ONCE A WEEK    Isis Darnell RN on 4/6/2022 at 11:03 AM

## 2022-04-17 ENCOUNTER — HEALTH MAINTENANCE LETTER (OUTPATIENT)
Age: 47
End: 2022-04-17

## 2022-04-17 DIAGNOSIS — L40.50 PSORIATIC ARTHRITIS (H): ICD-10-CM

## 2022-04-19 RX ORDER — FOLIC ACID 1 MG/1
TABLET ORAL
Qty: 90 TABLET | Refills: 0 | Status: SHIPPED | OUTPATIENT
Start: 2022-04-19 | End: 2022-08-25

## 2022-04-21 ENCOUNTER — VIRTUAL VISIT (OUTPATIENT)
Dept: FAMILY MEDICINE | Facility: CLINIC | Age: 47
End: 2022-04-21
Payer: COMMERCIAL

## 2022-04-21 DIAGNOSIS — L40.50 PSORIATIC ARTHRITIS (H): Primary | ICD-10-CM

## 2022-04-21 DIAGNOSIS — Z12.31 VISIT FOR SCREENING MAMMOGRAM: ICD-10-CM

## 2022-04-21 DIAGNOSIS — Z13.220 SCREENING FOR HYPERLIPIDEMIA: ICD-10-CM

## 2022-04-21 DIAGNOSIS — Z12.4 CERVICAL CANCER SCREENING: ICD-10-CM

## 2022-04-21 PROCEDURE — 99213 OFFICE O/P EST LOW 20 MIN: CPT | Mod: 95 | Performed by: NURSE PRACTITIONER

## 2022-04-21 RX ORDER — METHOTREXATE 2.5 MG/1
TABLET ORAL
Qty: 72 TABLET | Refills: 3 | Status: SHIPPED | OUTPATIENT
Start: 2022-04-21 | End: 2023-03-20

## 2022-04-21 NOTE — PROGRESS NOTES
CLAIRE is a 47 year old who is being evaluated via a billable telephone visit.      What phone number would you like to be contacted at? [phone]  How would you like to obtain your AVS? MyChart    Assessment & Plan     Psoriatic arthritis (H)  -well controlled, patient is due for labs   - CBC with platelets and differential; Future  - Comprehensive metabolic panel (BMP + Alb, Alk Phos, ALT, AST, Total. Bili, TP); Future  - methotrexate sodium 2.5 MG TABS; TAKE 6 TABLETS BY MOUTH ONCE A WEEK    Cervical cancer screening  -due for pap, patient will follow up for annual physical exam     Screening for hyperlipidemia    - Lipid panel reflex to direct LDL Fasting; Future    Visit for screening mammogram  -patient reports she had mammogram this year and will fax records           Return in about 2 weeks (around 5/5/2022) for Lab Work.    SELENA Trejo [facility]    Stevie RANGEL is a 47 year old who presents for the following health issues:    HPI     Chief Complaint   Patient presents with     Recheck Medication       Medication Followup of methotrexate    Taking Medication as prescribed: yes    Side Effects:  None    Medication Helping Symptoms:  yes         Review of Systems   Constitutional, HEENT, cardiovascular, pulmonary, gi and gu systems are negative, except as otherwise noted.      Objective           Vitals:  No vitals were obtained today due to virtual visit.    Physical Exam   healthy, alert and no distress  PSYCH: Alert and oriented times 3; coherent speech, normal   rate and volume, able to articulate logical thoughts, able   to abstract reason, no tangential thoughts, no hallucinations   or delusions  Her affect is normal  RESP: No cough, no audible wheezing, able to talk in full sentences  Remainder of exam unable to be completed due to telephone visits            Phone call duration: 11 minutes

## 2022-06-12 ENCOUNTER — HEALTH MAINTENANCE LETTER (OUTPATIENT)
Age: 47
End: 2022-06-12

## 2022-06-16 ENCOUNTER — TELEPHONE (OUTPATIENT)
Dept: FAMILY MEDICINE | Facility: CLINIC | Age: 47
End: 2022-06-16
Payer: COMMERCIAL

## 2022-06-16 NOTE — TELEPHONE ENCOUNTER
Patient Quality Outreach    Patient is due for the following:   Colon Cancer Screening -  FIT and Colonoscopy  Breast Cancer Screening - Mammogram  Cervical Cancer Screening - PAP Needed    NEXT STEPS:   Schedule a yearly physical    Type of outreach:    Sent letter.    Next Steps:  Reach out within 90 days via Letter.    Max number of attempts reached: No. Will try again in 90 days if patient still on fail list.    Questions for provider review:    None     Ly Peraza CMA  Chart routed to none.

## 2022-06-16 NOTE — LETTER
June 16, 2022      Norma Freire  [address]  [facility] [address]      Your healthcare team cares about your health. To provide you with the best care,   we have reviewed your chart and based on our findings, we see that you are due to:     - BREAST CANCER SCREENING:  Schedule Annual Mammogram. Breast Lottie scheduling number - [phone] or schedule in MyChart (self referall)  - CERVICAL CANCER SCREENING: Schedule a Cervical Cancer Screening, with Pap and wellness exam.   - COLON CANCER SCREENING:  Call or mychart the clinic to schedule your colonoscopy or schedule/ your FIT Test, or Cologuard test    If you have already completed these items, please contact the clinic via phone or   Mychart so your care team can review and update your records. Thank you for   choosing [facility] for your healthcare needs. For any questions,   concerns, or to schedule an appointment please contact the clinic.       Healthy Regards,      Your [facility] Care Team

## 2022-08-25 DIAGNOSIS — L40.50 PSORIATIC ARTHRITIS (H): ICD-10-CM

## 2022-08-25 NOTE — TELEPHONE ENCOUNTER
Pending Prescriptions:                       Disp   Refills    folic acid (FOLVITE) 1 MG tablet           90 tab*0        Sig: Take 1 tablet (1,000 mcg) by mouth daily    Routing refill request to provider for review/approval because:  Pt last seen 4/21/22 with instructions to return in 2 weeks for lab work.  Pt has not returned for labs.    Marylu Sparrow RN

## 2022-08-26 RX ORDER — FOLIC ACID 1 MG/1
1000 TABLET ORAL DAILY
Qty: 90 TABLET | Refills: 3 | Status: SHIPPED | OUTPATIENT
Start: 2022-08-26 | End: 2023-11-06

## 2022-10-23 ENCOUNTER — HEALTH MAINTENANCE LETTER (OUTPATIENT)
Age: 47
End: 2022-10-23

## 2023-02-24 ENCOUNTER — E-VISIT (OUTPATIENT)
Dept: URGENT CARE | Facility: CLINIC | Age: 48
End: 2023-02-24
Payer: COMMERCIAL

## 2023-02-24 DIAGNOSIS — B30.9 VIRAL CONJUNCTIVITIS: Primary | ICD-10-CM

## 2023-02-24 PROCEDURE — 99421 OL DIG E/M SVC 5-10 MIN: CPT | Performed by: PHYSICIAN ASSISTANT

## 2023-02-24 NOTE — PATIENT INSTRUCTIONS
Norma Freire,    Your photo and description of symptoms are consistent with pink eye caused by a virus. You'll be contagious while you have tearing and crusting in your eyes, generally 7-10 days. Wash your hands frequently and avoid touching your eyes to prevent spreading to others. You may use over the counter lubricating eye drops to help ease your symptoms. Avoid redness reducing eye drops as these can cause a rebound and make the redness and irritation return when you stop using the drops.     If your symptoms are getting worse or not improving by the 10th days of symptoms, be seen in person for further evaluation.    You'll be feeling better soon!    Lindsay Pollard PA-C  [facility]

## 2023-03-20 ENCOUNTER — MYC MEDICAL ADVICE (OUTPATIENT)
Dept: FAMILY MEDICINE | Facility: CLINIC | Age: 48
End: 2023-03-20
Payer: COMMERCIAL

## 2023-03-20 DIAGNOSIS — L40.50 PSORIATIC ARTHRITIS (H): ICD-10-CM

## 2023-03-20 RX ORDER — METHOTREXATE 2.5 MG/1
TABLET ORAL
Qty: 72 TABLET | Refills: 3 | Status: SHIPPED | OUTPATIENT
Start: 2023-03-20 | End: 2023-11-06

## 2023-03-20 NOTE — TELEPHONE ENCOUNTER
Debby  See mychart regarding mask mandate/lab draw at home.    Pended methotrexate refill      Karri Welsh RN

## 2023-04-19 ENCOUNTER — TELEPHONE (OUTPATIENT)
Dept: FAMILY MEDICINE | Facility: CLINIC | Age: 48
End: 2023-04-19
Payer: COMMERCIAL

## 2023-04-19 NOTE — TELEPHONE ENCOUNTER
Patient Quality Outreach    Patient is due for the following:   Colon Cancer Screening  Breast Cancer Screening - Mammogram  Cervical Cancer Screening - PAP Needed    Next Steps:   Schedule a Adult Preventative    Type of outreach:    Sent letter.    Next Steps:  Reach out within 90 days via Letter.    Max number of attempts reached: No. Will try again in 90 days if patient still on fail list.    Questions for provider review:    None     Ly Peraza CMA  Chart routed to none.

## 2023-04-19 NOTE — LETTER
April 19, 2023    To  Norma Freire  [address]  [place] [address]    Your team at [facility] cares about your health. We have reviewed your chart and based on our findings; we are making the following recommendations to better manage your health.     You are in particular need of attention regarding the following:     Schedule Annual MAMMOGRAPHY. [facility] scheduling number is [phone] or schedule in MyChart (self referral).  1 in 8 women will develop invasive breast cancer during her lifetime and it is the most common non-skin cancer in [place] Women. EARLY detection, new treatments, and a better understanding of the disease have increased survival rates- the 5 year survival rate in the 1960's was 63% and today it is close to 90%.  Schedule a primary care office visit with your provider for a Pap Smear to screen for Cervical Cancer.  Call or MyChart message your clinic to schedule a colonoscopy, schedule/ a FIT Test, or order a Cologuard test. If you are unsure what type of test you need, please call your clinic and speak to clinic staff.   Colon cancer is now the second leading cause of cancer-related deaths in the [place] for both men and women and there are over 130,000 new cases and 50,000 deaths per year from colon cancer. Colonoscopies can prevent 90-95% of these deaths. Problem lesions can be removed before they ever become cancer. This test is not only looking for cancer, but also getting rid of precancerous lesions.   Please call [phone] to schedule a colonoscopy.  Contact your clinic to schedule/ your FIT Test.     If you have already completed these items, please contact the clinic via phone or   PowerDsinehart so your care team can review and update your records. Thank you for   choosing [facility] for your healthcare needs. For any questions,   concerns, or to schedule an appointment please contact our clinic.    Healthy  Regards,      Your [facility] Team

## 2023-06-01 ENCOUNTER — HEALTH MAINTENANCE LETTER (OUTPATIENT)
Age: 48
End: 2023-06-01

## 2023-06-18 ENCOUNTER — HEALTH MAINTENANCE LETTER (OUTPATIENT)
Age: 48
End: 2023-06-18

## 2023-07-13 ENCOUNTER — TELEPHONE (OUTPATIENT)
Dept: FAMILY MEDICINE | Facility: CLINIC | Age: 48
End: 2023-07-13
Payer: COMMERCIAL

## 2023-07-13 NOTE — LETTER
July 13, 2023    To  Norma Freire  [address]  [place] [address]    Your team at [facility] cares about your health. We have reviewed your chart and based on our findings; we are making the following recommendations to better manage your health.     You are in particular need of attention regarding the following:     Schedule Annual MAMMOGRAPHY. [facility] scheduling number is [phone] or schedule in MyChart (self referral).  1 in 8 women will develop invasive breast cancer during her lifetime and it is the most common non-skin cancer in [place] Women. EARLY detection, new treatments, and a better understanding of the disease have increased survival rates- the 5 year survival rate in the 1960's was 63% and today it is close to 90%.  Schedule a primary care office visit with your provider for a Pap Smear to screen for Cervical Cancer.  Call or MyChart message your clinic to schedule a colonoscopy, schedule/ a FIT Test, or order a Cologuard test. If you are unsure what type of test you need, please call your clinic and speak to clinic staff.   Colon cancer is now the second leading cause of cancer-related deaths in the [place] for both men and women and there are over 130,000 new cases and 50,000 deaths per year from colon cancer. Colonoscopies can prevent 90-95% of these deaths. Problem lesions can be removed before they ever become cancer. This test is not only looking for cancer, but also getting rid of precancerous lesions.   Please call [phone] to schedule a colonoscopy.    If you have already completed these items, please contact the clinic via phone or   BeeTVhart so your care team can review and update your records. Thank you for   choosing [facility] for your healthcare needs. For any questions,   concerns, or to schedule an appointment please contact our clinic.    Healthy Regards,      Your [facility] Care Team

## 2023-11-01 ENCOUNTER — TELEPHONE (OUTPATIENT)
Dept: FAMILY MEDICINE | Facility: CLINIC | Age: 48
End: 2023-11-01
Payer: COMMERCIAL

## 2023-11-01 DIAGNOSIS — L40.50 PSORIATIC ARTHRITIS (H): ICD-10-CM

## 2023-11-02 RX ORDER — FOLIC ACID 1 MG/1
1000 TABLET ORAL DAILY
Qty: 90 TABLET | Refills: 0 | OUTPATIENT
Start: 2023-11-02

## 2023-11-02 NOTE — TELEPHONE ENCOUNTER
Overdue for needed care. Please call to schedule in clinic annual wellness exam. Once appt is scheduled, route back to the pool.    Julie Behrendt RN

## 2023-11-06 RX ORDER — FOLIC ACID 1 MG/1
1000 TABLET ORAL DAILY
Qty: 30 TABLET | Refills: 0 | Status: SHIPPED | OUTPATIENT
Start: 2023-11-06 | End: 2023-12-20

## 2023-11-06 RX ORDER — METHOTREXATE 2.5 MG/1
TABLET ORAL
Qty: 72 TABLET | Refills: 0 | Status: SHIPPED | OUTPATIENT
Start: 2023-11-06 | End: 2023-12-20

## 2023-11-06 NOTE — TELEPHONE ENCOUNTER
Patient asking for bridge refill of meds until her appointment on 12/20/23.    Last Written Prescription Date:  8/26/22  Last Fill Quantity: 90,  # refills: 3   Last office visit: 6/30/2021 ; last virtual visit: 4/21/2022 with prescribing provider:     Future Office Visit:   Next 5 appointments (look out 90 days)      Dec 20, 2023  4:30 PM  (Arrive by 4:10 PM)  Provider Visit with SELENA Wang CNP  [facility] ([facility] )  Arrive at: Clinic [address] [facility] [address] [phone]             Message routed to provider for consideration.     Julie Behrendt RN

## 2023-11-06 NOTE — TELEPHONE ENCOUNTER
Left message on pt identified VM for patient to return call to clinic to schedule annual exam.     Nita Bansal RN  [facility]

## 2023-12-20 ENCOUNTER — OFFICE VISIT (OUTPATIENT)
Dept: FAMILY MEDICINE | Facility: CLINIC | Age: 48
End: 2023-12-20
Payer: COMMERCIAL

## 2023-12-20 VITALS
BODY MASS INDEX: 34.2 KG/M2 | DIASTOLIC BLOOD PRESSURE: 76 MMHG | RESPIRATION RATE: 16 BRPM | SYSTOLIC BLOOD PRESSURE: 122 MMHG | HEART RATE: 63 BPM | OXYGEN SATURATION: 98 % | WEIGHT: 185.5 LBS | TEMPERATURE: 97.6 F

## 2023-12-20 DIAGNOSIS — L40.50 PSORIATIC ARTHRITIS (H): Primary | ICD-10-CM

## 2023-12-20 LAB
ALBUMIN SERPL BCG-MCNC: 4.5 G/DL (ref 3.5–5.2)
ALP SERPL-CCNC: 88 U/L (ref 40–150)
ALT SERPL W P-5'-P-CCNC: 12 U/L (ref 0–50)
ANION GAP SERPL CALCULATED.3IONS-SCNC: 10 MMOL/L (ref 7–15)
AST SERPL W P-5'-P-CCNC: 11 U/L (ref 0–45)
BASOPHILS # BLD AUTO: 0 10E3/UL (ref 0–0.2)
BASOPHILS NFR BLD AUTO: 0 %
BILIRUB SERPL-MCNC: 0.2 MG/DL
BUN SERPL-MCNC: 8.1 MG/DL (ref 6–20)
CALCIUM SERPL-MCNC: 9.6 MG/DL (ref 8.6–10)
CHLORIDE SERPL-SCNC: 103 MMOL/L (ref 98–107)
CREAT SERPL-MCNC: 0.62 MG/DL (ref 0.51–0.95)
CRP SERPL-MCNC: 7.9 MG/L
DEPRECATED HCO3 PLAS-SCNC: 24 MMOL/L (ref 22–29)
EGFRCR SERPLBLD CKD-EPI 2021: >90 ML/MIN/1.73M2
EOSINOPHIL # BLD AUTO: 0.4 10E3/UL (ref 0–0.7)
EOSINOPHIL NFR BLD AUTO: 4 %
ERYTHROCYTE [DISTWIDTH] IN BLOOD BY AUTOMATED COUNT: 13.2 % (ref 10–15)
ERYTHROCYTE [SEDIMENTATION RATE] IN BLOOD BY WESTERGREN METHOD: 9 MM/HR (ref 0–20)
GLUCOSE SERPL-MCNC: 89 MG/DL (ref 70–99)
HCT VFR BLD AUTO: 42.4 % (ref 35–47)
HGB BLD-MCNC: 13.8 G/DL (ref 11.7–15.7)
IMM GRANULOCYTES # BLD: 0 10E3/UL
IMM GRANULOCYTES NFR BLD: 0 %
LYMPHOCYTES # BLD AUTO: 2.7 10E3/UL (ref 0.8–5.3)
LYMPHOCYTES NFR BLD AUTO: 29 %
MCH RBC QN AUTO: 29.7 PG (ref 26.5–33)
MCHC RBC AUTO-ENTMCNC: 32.5 G/DL (ref 31.5–36.5)
MCV RBC AUTO: 91 FL (ref 78–100)
MONOCYTES # BLD AUTO: 0.9 10E3/UL (ref 0–1.3)
MONOCYTES NFR BLD AUTO: 9 %
NEUTROPHILS # BLD AUTO: 5.5 10E3/UL (ref 1.6–8.3)
NEUTROPHILS NFR BLD AUTO: 58 %
PLATELET # BLD AUTO: 321 10E3/UL (ref 150–450)
POTASSIUM SERPL-SCNC: 4.4 MMOL/L (ref 3.4–5.3)
PROT SERPL-MCNC: 7.4 G/DL (ref 6.4–8.3)
RBC # BLD AUTO: 4.65 10E6/UL (ref 3.8–5.2)
SODIUM SERPL-SCNC: 137 MMOL/L (ref 135–145)
WBC # BLD AUTO: 9.5 10E3/UL (ref 4–11)

## 2023-12-20 PROCEDURE — 99213 OFFICE O/P EST LOW 20 MIN: CPT | Performed by: NURSE PRACTITIONER

## 2023-12-20 PROCEDURE — 36415 COLL VENOUS BLD VENIPUNCTURE: CPT | Performed by: NURSE PRACTITIONER

## 2023-12-20 PROCEDURE — 85652 RBC SED RATE AUTOMATED: CPT | Performed by: NURSE PRACTITIONER

## 2023-12-20 PROCEDURE — 85025 COMPLETE CBC W/AUTO DIFF WBC: CPT | Performed by: NURSE PRACTITIONER

## 2023-12-20 PROCEDURE — 86140 C-REACTIVE PROTEIN: CPT | Performed by: NURSE PRACTITIONER

## 2023-12-20 PROCEDURE — 80053 COMPREHEN METABOLIC PANEL: CPT | Performed by: NURSE PRACTITIONER

## 2023-12-20 RX ORDER — FOLIC ACID 1 MG/1
1000 TABLET ORAL DAILY
Qty: 30 TABLET | Refills: 0 | Status: CANCELLED | OUTPATIENT
Start: 2023-12-20

## 2023-12-20 RX ORDER — METHOTREXATE 2.5 MG/1
TABLET ORAL
Qty: 72 TABLET | Refills: 3 | Status: SHIPPED | OUTPATIENT
Start: 2023-12-20

## 2023-12-20 RX ORDER — FOLIC ACID 1 MG/1
1000 TABLET ORAL DAILY
Qty: 30 TABLET | Refills: 0 | Status: SHIPPED | OUTPATIENT
Start: 2023-12-20

## 2023-12-20 RX ORDER — METHOTREXATE 2.5 MG/1
TABLET ORAL
Qty: 72 TABLET | Refills: 0 | Status: CANCELLED | OUTPATIENT
Start: 2023-12-20

## 2023-12-20 RX ORDER — CELECOXIB 100 MG/1
100 CAPSULE ORAL 2 TIMES DAILY PRN
Qty: 60 CAPSULE | Refills: 3 | Status: SHIPPED | OUTPATIENT
Start: 2023-12-20

## 2023-12-20 ASSESSMENT — PAIN SCALES - GENERAL: PAINLEVEL: NO PAIN (0)

## 2023-12-20 NOTE — PATIENT INSTRUCTIONS
You may try weaning off Methotrexate reducing by 1 tablet every 1-2 weeks     Recheck labs in 1 month (liver, inflammatory markers)

## 2023-12-20 NOTE — PROGRESS NOTES
Assessment & Plan     Psoriatic arthritis (H)  -symptoms well controlled, patient would like to try decrease Methotrexate and potentially go off Methotrexate, she is currently reduced on her own to 5 tablets per week and feeling well, she would like to go down to 4 tablets per week, since she is asymptomatic after lowering her Methotrexate I advised her that she can try to decrease to 4 tablets per week and continue to monitor  -she does not want to see rheumatology any longer and since she is doing well she wants me to manage her Methotrexate for now  - I recommended to repeat labs and her labs all normal. Patient reported that she would like to try Turmeric and some soft of herbal tea that I do not have any knowledge about it. I strongly advised patient to be very careful about taking any herbal products due to potential drug interactions with Methotrexate   - methotrexate 2.5 MG tablet; TAKE 6 TABLETS BY MOUTH ONCE A WEEK  - folic acid (FOLVITE) 1 MG tablet; Take 1 tablet (1,000 mcg) by mouth daily  - Comprehensive metabolic panel (BMP + Alb, Alk Phos, ALT, AST, Total. Bili, TP); Future  - CBC with platelets and differential; Future  - CRP, inflammation; Future  - ESR: Erythrocyte sedimentation rate; Future  - celecoxib (CELEBREX) 100 MG capsule; Take 1 capsule (100 mg) by mouth 2 times daily as needed for moderate pain  - Comprehensive metabolic panel (BMP + Alb, Alk Phos, ALT, AST, Total. Bili, TP)  - CBC with platelets and differential  - CRP, inflammation  - ESR: Erythrocyte sedimentation rate  - Comprehensive metabolic panel (BMP + Alb, Alk Phos, ALT, AST, Total. Bili, TP); Future  -recheck liver enzymes again in 3 months         SELENA Trejo [facility]    Stevie RANGEL is a 48 year old, presenting for the following health issues:  Refill Request        12/20/2023     4:17 PM   Additional Questions   Roomed by magalis   Accompanied by self         12/20/2023      4:17 PM   Patient Reported Additional Medications   Patient reports taking the following new medications none       History of Present Illness       Reason for visit:  Psoriatic arthritis    She eats 2-3 servings of fruits and vegetables daily.She consumes 0 sweetened beverage(s) daily.She exercises with enough effort to increase her heart rate 30 to 60 minutes per day.  She exercises with enough effort to increase her heart rate 4 days per week. She is missing 1 dose(s) of medications per week.     Pain History:  When did you first notice your pain? Chronic    Have you seen this provider for your pain in the past? Yes   Where in your body do you have pain? All over, mostly hands   Are you seeing anyone else for your pain? No            12/20/2023     4:28 PM   PEG Score   PEG Total Score 0.33       Review of Systems   Constitutional, HEENT, cardiovascular, pulmonary, gi and gu systems are negative, except as otherwise noted.      Objective    /76   Pulse 63   Temp 97.6  F (36.4  C) (Tympanic)   Resp 16   Wt 84.1 kg (185 lb 8 oz)   SpO2 98%   BMI 34.20 kg/m    Body mass index is 34.2 kg/m .  Physical Exam   GENERAL: healthy, alert and no distress  EYES: Eyes grossly normal to inspection, PERRL and conjunctivae and sclerae normal  RESP: lungs clear to auscultation - no rales, rhonchi or wheezes  CV: regular rate and rhythm, normal S1 S2, no S3 or S4, no murmur, click or rub, no peripheral edema and peripheral pulses strong  MS: no gross musculoskeletal defects noted, no edema  SKIN: no suspicious lesions or rashes  NEURO: Normal strength and tone, mentation intact and speech normal  PSYCH: mentation appears normal, affect normal/bright    Results for orders placed or performed in visit on 12/20/23 (from the past 24 hour(s))   Comprehensive metabolic panel (BMP + Alb, Alk Phos, ALT, AST, Total. Bili, TP)   Result Value Ref Range    Sodium 137 135 - 145 mmol/L    Potassium 4.4 3.4 - 5.3 mmol/L    Carbon  Dioxide (CO2) 24 22 - 29 mmol/L    Anion Gap 10 7 - 15 mmol/L    Urea Nitrogen 8.1 6.0 - 20.0 mg/dL    Creatinine 0.62 0.51 - 0.95 mg/dL    GFR Estimate >90 >60 mL/min/1.73m2    Calcium 9.6 8.6 - 10.0 mg/dL    Chloride 103 98 - 107 mmol/L    Glucose 89 70 - 99 mg/dL    Alkaline Phosphatase 88 40 - 150 U/L    AST 11 0 - 45 U/L    ALT 12 0 - 50 U/L    Protein Total 7.4 6.4 - 8.3 g/dL    Albumin 4.5 3.5 - 5.2 g/dL    Bilirubin Total 0.2 <=1.2 mg/dL   CBC with platelets and differential    Narrative    The following orders were created for panel order CBC with platelets and differential.  Procedure                               Abnormality         Status                     ---------                               -----------         ------                     CBC with platelets and d...[[id number]]                      Final result                 Please view results for these tests on the individual orders.   CRP, inflammation   Result Value Ref Range    CRP Inflammation 7.90 (H) <5.00 mg/L   ESR: Erythrocyte sedimentation rate   Result Value Ref Range    Erythrocyte Sedimentation Rate 9 0 - 20 mm/hr   CBC with platelets and differential   Result Value Ref Range    WBC Count 9.5 4.0 - 11.0 10e3/uL    RBC Count 4.65 3.80 - 5.20 10e6/uL    Hemoglobin 13.8 11.7 - 15.7 g/dL    Hematocrit 42.4 35.0 - 47.0 %    MCV 91 78 - 100 fL    MCH 29.7 26.5 - 33.0 pg    MCHC 32.5 31.5 - 36.5 g/dL    RDW 13.2 10.0 - 15.0 %    Platelet Count 321 150 - 450 10e3/uL    % Neutrophils 58 %    % Lymphocytes 29 %    % Monocytes 9 %    % Eosinophils 4 %    % Basophils 0 %    % Immature Granulocytes 0 %    Absolute Neutrophils 5.5 1.6 - 8.3 10e3/uL    Absolute Lymphocytes 2.7 0.8 - 5.3 10e3/uL    Absolute Monocytes 0.9 0.0 - 1.3 10e3/uL    Absolute Eosinophils 0.4 0.0 - 0.7 10e3/uL    Absolute Basophils 0.0 0.0 - 0.2 10e3/uL    Absolute Immature Granulocytes 0.0 <=0.4 10e3/uL

## 2024-01-10 ENCOUNTER — LAB (OUTPATIENT)
Dept: LAB | Facility: CLINIC | Age: 49
End: 2024-01-10
Payer: COMMERCIAL

## 2024-01-10 DIAGNOSIS — L40.50 PSORIATIC ARTHRITIS (H): ICD-10-CM

## 2024-01-10 PROCEDURE — 80053 COMPREHEN METABOLIC PANEL: CPT

## 2024-01-10 PROCEDURE — 36415 COLL VENOUS BLD VENIPUNCTURE: CPT

## 2024-01-11 LAB
ALBUMIN SERPL BCG-MCNC: 4.6 G/DL (ref 3.5–5.2)
ALP SERPL-CCNC: 90 U/L (ref 40–150)
ALT SERPL W P-5'-P-CCNC: 13 U/L (ref 0–50)
ANION GAP SERPL CALCULATED.3IONS-SCNC: 13 MMOL/L (ref 7–15)
AST SERPL W P-5'-P-CCNC: 15 U/L (ref 0–45)
BILIRUB SERPL-MCNC: 0.2 MG/DL
BUN SERPL-MCNC: 10.6 MG/DL (ref 6–20)
CALCIUM SERPL-MCNC: 9.8 MG/DL (ref 8.6–10)
CHLORIDE SERPL-SCNC: 101 MMOL/L (ref 98–107)
CREAT SERPL-MCNC: 0.68 MG/DL (ref 0.51–0.95)
DEPRECATED HCO3 PLAS-SCNC: 25 MMOL/L (ref 22–29)
EGFRCR SERPLBLD CKD-EPI 2021: >90 ML/MIN/1.73M2
GLUCOSE SERPL-MCNC: 78 MG/DL (ref 70–99)
POTASSIUM SERPL-SCNC: 4.7 MMOL/L (ref 3.4–5.3)
PROT SERPL-MCNC: 7.5 G/DL (ref 6.4–8.3)
SODIUM SERPL-SCNC: 139 MMOL/L (ref 135–145)

## 2024-03-27 ENCOUNTER — TELEPHONE (OUTPATIENT)
Dept: FAMILY MEDICINE | Facility: CLINIC | Age: 49
End: 2024-03-27
Payer: COMMERCIAL

## 2024-03-27 NOTE — LETTER
March 27, 2024    To  Norma Freire  [address]  [facility] [place] [address]    Your team at [facility] cares about your health. We have reviewed your chart and based on our findings; we are making the following recommendations to better manage your health.     You are in particular need of attention regarding the following:     Call or MyChart message your clinic to schedule a colonoscopy, schedule/ a FIT Test, or order a Cologuard test. If you are unsure what type of test you need, please call your clinic and speak to clinic staff.   Colon cancer is now the second leading cause of cancer-related deaths in the [place] for both men and women and there are over 130,000 new cases and 50,000 deaths per year from colon cancer. Colonoscopies can prevent 90-95% of these deaths. Problem lesions can be removed before they ever become cancer. This test is not only looking for cancer, but also getting rid of precancerous lesions.   Please call [phone] to schedule a colonoscopy.  Contact your clinic to schedule/ your FIT Test.   Schedule Annual MAMMOGRAPHY. [facility] scheduling number is [phone] or schedule in [email or web address] (self referral).  Schedule a primary care office visit with your provider for a Pap Smear to screen for Cervical Cancer.    If you have already completed these items, please contact the clinic via phone or   [email or web address] so your care team can review and update your records. Thank you for   choosing [facility] for your healthcare needs. For any questions,   concerns, or to schedule an appointment please contact our clinic.    Healthy Regards,      Your [facility] Care Team

## 2024-06-02 ENCOUNTER — HEALTH MAINTENANCE LETTER (OUTPATIENT)
Age: 49
End: 2024-06-02

## 2024-06-06 ENCOUNTER — TELEPHONE (OUTPATIENT)
Dept: FAMILY MEDICINE | Facility: CLINIC | Age: 49
End: 2024-06-06
Payer: COMMERCIAL

## 2024-06-06 NOTE — LETTER
June 6, 2024    To  Norma Freire  [address]  [facility] [place] [address]    Your team at [facility] cares about your health. We have reviewed your chart and based on our findings; we are making the following recommendations to better manage your health.     You are in particular need of attention regarding the following:     Call or MyChart message your clinic to schedule a colonoscopy, schedule/ a FIT Test, or order a Cologuard test. If you are unsure what type of test you need, please call your clinic and speak to clinic staff.   Colon cancer is now the second leading cause of cancer-related deaths in the [place] for both men and women and there are over 130,000 new cases and 50,000 deaths per year from colon cancer. Colonoscopies can prevent 90-95% of these deaths. Problem lesions can be removed before they ever become cancer. This test is not only looking for cancer, but also getting rid of precancerous lesions.   Please call [phone] to schedule a colonoscopy.  Contact your clinic to schedule/ your FIT Test.   Schedule Annual MAMMOGRAPHY. [facility] scheduling number is [phone] or schedule in Elastagenhart (self referral).  Schedule a primary care office visit with your provider for a Pap Smear to screen for Cervical Cancer.    If you have already completed these items, please contact the clinic via phone or   Elastagenhart so your care team can review and update your records. Thank you for   choosing [facility]ics for your healthcare needs. For any questions,   concerns, or to schedule an appointment please contact our clinic.    Healthy Regards,      Your [facility] Care Team

## 2024-11-04 ENCOUNTER — MYC MEDICAL ADVICE (OUTPATIENT)
Dept: FAMILY MEDICINE | Facility: CLINIC | Age: 49
End: 2024-11-04
Payer: COMMERCIAL

## 2024-11-04 DIAGNOSIS — L40.9 PSORIASIS: Primary | ICD-10-CM

## 2024-11-04 NOTE — TELEPHONE ENCOUNTER
Debby,    Please see [employer] message below and advise if willing to refill. Last OV 12/20/23.    Thank you  Gurjit MARQUEZ RN  [facility]

## 2024-11-05 RX ORDER — FLUOCINONIDE TOPICAL SOLUTION USP, 0.05% 0.5 MG/ML
SOLUTION TOPICAL 2 TIMES DAILY
Qty: 60 ML | Refills: 0 | Status: SHIPPED | OUTPATIENT
Start: 2024-11-05

## 2024-11-06 ENCOUNTER — TELEPHONE (OUTPATIENT)
Dept: FAMILY MEDICINE | Facility: CLINIC | Age: 49
End: 2024-11-06

## 2024-11-06 NOTE — TELEPHONE ENCOUNTER
Patient Quality Outreach    Patient is due for the following:   Colon Cancer Screening  Cervical Cancer Screening - PAP Needed    Next Steps:   Schedule a Adult Preventative    Type of outreach:    Sent letter.    Next Steps:  Reach out within 90 days via Letter.    Max number of attempts reached: No. Will try again in 90 days if patient still on fail list.    Questions for provider review:    None           Ly Peraza CMA  Chart routed to none.

## 2024-11-06 NOTE — LETTER
November 6, 2024    To  Norma Freire  [address]  [facility] [place] [address]    Your team at [facility] cares about your health. We have reviewed your chart and based on our findings; we are making the following recommendations to better manage your health.     You are in particular need of attention regarding the following:     Call or MyChart message your clinic to schedule a colonoscopy, schedule/ a FIT Test, or order a Cologuard test. If you are unsure what type of test you need, please call your clinic and speak to clinic staff.   Colon cancer is now the second leading cause of cancer-related deaths in the [place] for both men and women and there are over 130,000 new cases and 50,000 deaths per year from colon cancer. Colonoscopies can prevent 90-95% of these deaths. Problem lesions can be removed before they ever become cancer. This test is not only looking for cancer, but also getting rid of precancerous lesions.   Schedule a primary care office visit with your provider for a Pap Smear to screen for Cervical Cancer.  PREVENTATIVE VISIT: Physical    If you have already completed these items, please contact the clinic via phone or   MyChart so your care team can review and update your records. Thank you for   choosing [facility] for your healthcare needs. For any questions,   concerns, or to schedule an appointment please contact our clinic.    Healthy Regards,      Your [facility] Care Team

## 2024-11-06 NOTE — LETTER
11/6/2024      Norma Freire  [address]  [facility] [address]        Patient Quality Outreach    Patient is due for the following:   Colon Cancer Screening  Cervical Cancer Screening - PAP Needed    Next Steps:   Schedule a Adult Preventative    Type of outreach:    Sent letter.    Next Steps:  Reach out within 90 days via Letter.    Max number of attempts reached: No. Will try again in 90 days if patient still on fail list.    Questions for provider review:    None           Ly Peraza CMA  Chart routed to none.          Sincerely,        SELENA Trejo CNP

## 2024-12-04 ENCOUNTER — TELEPHONE (OUTPATIENT)
Dept: FAMILY MEDICINE | Facility: CLINIC | Age: 49
End: 2024-12-04
Payer: COMMERCIAL

## 2024-12-04 NOTE — LETTER
December 4, 2024    To  Norma Freire  [address]  [facility] [place] [address]    Your team at [facility] cares about your health. We have reviewed your chart and based on our findings; we are making the following recommendations to better manage your health.     You are in particular need of attention regarding the following:     Call or MyChart message your clinic to schedule a colonoscopy, schedule/ a FIT Test, or order a Cologuard test. If you are unsure what type of test you need, please call your clinic and speak to clinic staff.   Colon cancer is now the second leading cause of cancer-related deaths in the [place] for both men and women and there are over 130,000 new cases and 50,000 deaths per year from colon cancer. Colonoscopies can prevent 90-95% of these deaths. Problem lesions can be removed before they ever become cancer. This test is not only looking for cancer, but also getting rid of precancerous lesions.   Schedule a primary care office visit with your provider for a Pap Smear to screen for Cervical Cancer.  PREVENTATIVE VISIT: Physical    If you have already completed these items, please contact the clinic via phone or   MyChart so your care team can review and update your records. Thank you for   choosing [facility] for your healthcare needs. For any questions,   concerns, or to schedule an appointment please contact our clinic.    Healthy Regards,      Your [facility] Care Team

## 2024-12-04 NOTE — TELEPHONE ENCOUNTER
Patient Quality Outreach    Patient is due for the following:   Colon Cancer Screening  Cervical Cancer Screening - PAP Needed  Physical Preventive Adult Physical    Action(s) Taken:   Schedule a Adult Preventative    Type of outreach:    Sent letter.    Questions for provider review:    None           Ly Peraza CMA  Chart routed to none.

## 2025-06-15 ENCOUNTER — HEALTH MAINTENANCE LETTER (OUTPATIENT)
Age: 50
End: 2025-06-15